# Patient Record
Sex: MALE | Race: WHITE | NOT HISPANIC OR LATINO | Employment: OTHER | URBAN - METROPOLITAN AREA
[De-identification: names, ages, dates, MRNs, and addresses within clinical notes are randomized per-mention and may not be internally consistent; named-entity substitution may affect disease eponyms.]

---

## 2018-03-21 ENCOUNTER — THERAPY VISIT (OUTPATIENT)
Dept: PHYSICAL THERAPY | Facility: CLINIC | Age: 78
End: 2018-03-21
Payer: MEDICARE

## 2018-03-21 DIAGNOSIS — M19.011 PRIMARY OSTEOARTHRITIS OF RIGHT SHOULDER: Primary | ICD-10-CM

## 2018-03-21 PROCEDURE — G8984 CARRY CURRENT STATUS: HCPCS | Mod: GP | Performed by: PHYSICAL THERAPIST

## 2018-03-21 PROCEDURE — 97110 THERAPEUTIC EXERCISES: CPT | Mod: GP | Performed by: PHYSICAL THERAPIST

## 2018-03-21 PROCEDURE — 97112 NEUROMUSCULAR REEDUCATION: CPT | Mod: GP | Performed by: PHYSICAL THERAPIST

## 2018-03-21 PROCEDURE — 97161 PT EVAL LOW COMPLEX 20 MIN: CPT | Mod: GP | Performed by: PHYSICAL THERAPIST

## 2018-03-21 PROCEDURE — G8985 CARRY GOAL STATUS: HCPCS | Mod: GP | Performed by: PHYSICAL THERAPIST

## 2018-03-21 NOTE — LETTER
DEPARTMENT OF HEALTH AND HUMAN SERVICES  CENTERS FOR MEDICARE & MEDICAID SERVICES    PLAN/UPDATED PLAN OF PROGRESS FOR OUTPATIENT REHABILITATION    PATIENTS NAME:  Imer Burns   : 1940  PROVIDER NUMBER:    [id number]  [place]:  [id number]  PROVIDER NAME: [facility]  MEDICAL RECORD NUMBER: [id number]   START OF CARE DATE:  SOC Date: 18   TYPE:  PT    PRIMARY/TREATMENT DIAGNOSIS: (Pertinent Medical Diagnosis)  Primary osteoarthritis of right shoulder    VISITS FROM START OF CARE:  Rxs Used: 1   [facility] Initial Evaluation  Subjective:  Patient is a 77 year old male presenting with rehab right shoulder hpi. The history is provided by the patient. No  was used.   Imer Burns is a 77 year old male with a right shoulder condition.  Condition occurred with:  Degenerative joint disease.  Condition occurred: other.  This is a chronic condition  3/15/18 pt saw MD for progressive right shoulder pain. MD dx of GH OA.  He did see another MD prior to seeing Dr Ramon and did receive an injection in clinic with moderate to significant relief.  Pt is right handed.  Patient reports pain:  In the joint.  Radiates to:  Upper arm.  Pain is described as sharp and is intermittent and reported as 7/10.  Associated symptoms:  Catching. Pain is worse during the day.  Symptoms are exacerbated by using arm at shoulder level, using arm overhead, lifting, using arm behind back and carrying (weight lifting, golf, racketball ) and relieved by rest.  Since onset symptoms are gradually worsening.  Special tests:  X-ray.  Previous treatment includes other (injection).  General health as reported by patient is good.  Pertinent medical history includes:  High blood pressure, cancer, heart problems, smoking and sleep disorder/apnea.  Other surgeries include:  Other, cancer surgery and orthopedic surgery. Current medications:  High blood pressure medication.  Current occupation is  Retired.        Barriers include:  None as reported by the patient.    Red flags:  None as reported by the patient.        Objective:  Standing Alignment:    Cervical/Thoracic:  Forward head  Shoulder/UE:  Protracted scapula R and scapular downward rotation R          Shoulder Evaluation:  ROM:  AROM:    Flexion:  Left:  150    Right:  122  Abduction:  Left: 158   Right:  118  External Rotation:  Left:  78    Right:  65  Flexion/External Rotation:  Left:  T3    Right:  T1  Extension/Internal Rotation:  Left:  T7    Right:  T12    Strength:  : Moderate to significant crepitus noted with MMT to R shoulder.  Flexion: Right: 5/5       Extension:  Right: 5/5      Abduction:  Right: 5/5       Adduction:  Right: 5/5       Internal Rotation:  Right: 5/5      External Rotation:   Right:5/5       Special Tests:    Right shoulder positive for the following special tests:Impingement (Sig tightness and mild pain with Neer, H-K, and crossover)  Palpation:  normal  Mobility Tests:    Glenohumeral posterior right:  Hypomobile    PATIENTS NAME:  Imer Burns   : 1940    Assessment/Plan:    Patient is a 77 year old male with right side shoulder complaints.    Patient has the following significant findings with corresponding treatment plan.                Diagnosis 1:  GH OA  Pain -  hot/cold therapy and home program  Decreased ROM/flexibility - manual therapy and therapeutic exercise  Decreased joint mobility - manual therapy and therapeutic exercise  Decreased strength - therapeutic exercise and therapeutic activities  Decreased proprioception - neuro re-education and therapeutic activities  Inflammation - cold therapy and self management/home program  Impaired muscle performance - neuro re-education  Decreased function - therapeutic activities  Impaired posture - neuro re-education    Therapy Evaluation Codes:   1) History comprised of:   Personal factors that impact the plan of care:      None.    Comorbidity factors that  "impact the plan of care are:      Cancer, Heart problems, High blood pressure and Sleep disorder/apnea.     Medications impacting care: Cardiac and High blood pressure.  2) Examination of Body Systems comprised of:   Body structures and functions that impact the plan of care:      Shoulder.   Activity limitations that impact the plan of care are:      Bathing, Dressing, Lifting, Sports and reaching.  3) Clinical presentation characteristics are:   Stable/Uncomplicated.  4) Decision-Making    Low complexity using standardized patient assessment instrument and/or measureable assessment of functional outcome.  Cumulative Therapy Evaluation is: Low complexity.    Previous and current functional limitations:  (See Goal Flow Sheet for this information)    Short term and Long term goals: (See Goal Flow Sheet for this information)     Communication ability:  Patient appears to be able to clearly communicate and understand verbal and written communication and follow directions correctly.  Treatment Explanation - The following has been discussed with the patient:     RX ordered/plan of care  Anticipated outcomes  Possible risks and side effects  This patient would benefit from PT intervention to resume normal activities.   Rehab potential is good.    Frequency:  1 X week, once daily  Duration:  for 6 weeks  Discharge Plan:  Achieve all LTG.  Independent in home treatment program.  Reach maximal therapeutic benefit.      Caregiver Signature/Credentials _____________________________ Date ________     Treating Provider: Kristin Mendez PT ATC   I have reviewed and certified the need for these services and plan of treatment while under my care.        PHYSICIAN'S SIGNATURE:   _________________________________________  Date___________    Ángel Ramon MD    Certification period:  Beginning of Cert date period: 03/21/18 to  End of Cert period date: 06/18/18     Functional Level Progress Report: Please see attached \"Goal " "Flow sheet for Functional level.\"    ____X____ Continue Services or       ________ DC Services                Service dates: From  SOC Date: 03/21/18 date to present                         "

## 2018-03-21 NOTE — PROGRESS NOTES
[facility] Initial Evaluation  Subjective:  Patient is a 77 year old male presenting with rehab left ankle/foot hpi.                                      Pertinent medical history includes:  High blood pressure, cancer, heart problems, smoking and sleep disorder/apnea.    Other surgeries include:  Other, cancer surgery and orthopedic surgery.  Current medications:  High blood pressure medication.  Current occupation is Retired.                                    Objective:  System    Physical Exam    General     ROS    Assessment/Plan:

## 2018-03-21 NOTE — PROGRESS NOTES
[facility] Initial Evaluation  Subjective:  Patient is a 77 year old male presenting with rehab right shoulder hpi. The history is provided by the patient. No  was used.   Imer Burns is a 77 year old male with a right shoulder condition.  Condition occurred with:  Degenerative joint disease.  Condition occurred: other.  This is a chronic condition  3/15/18 pt saw MD for progressive right shoulder pain. MD dx of GH OA.  He did see another MD prior to seeing Dr Ramon and did receive an injection in clinic with moderate to significant relief.  Pt is right handed..    Patient reports pain:  In the joint.  Radiates to:  Upper arm.  Pain is described as sharp and is intermittent and reported as 7/10.  Associated symptoms:  Catching. Pain is worse during the day.  Symptoms are exacerbated by using arm at shoulder level, using arm overhead, lifting, using arm behind back and carrying (weight lifting, golf, racketball ) and relieved by rest.  Since onset symptoms are gradually worsening.  Special tests:  X-ray.  Previous treatment includes other (injection).    General health as reported by patient is good.                  Barriers include:  None as reported by the patient.    Red flags:  None as reported by the patient.                        Objective:  Standing Alignment:    Cervical/Thoracic:  Forward head  Shoulder/UE:  Protracted scapula R and scapular downward rotation R                                       Shoulder Evaluation:  ROM:  AROM:    Flexion:  Left:  150    Right:  122    Abduction:  Left: 158   Right:  118      External Rotation:  Left:  78    Right:  65          Flexion/External Rotation:  Left:  T3    Right:  T1  Extension/Internal Rotation:  Left:  T7    Right:  T12          Strength:  : Moderate to significant crepitus noted with MMT to R shoulder.  Flexion: Right: 5/5     Pain:   Extension:  Right: 5/5    Pain:  Abduction:  Right: 5/5     Pain:  Adduction:   Right: 5/5     Pain:  Internal Rotation:  Right: 5/5     Pain:  External Rotation:   Right:5/5     Pain:              Special Tests:      Right shoulder positive for the following special tests:Impingement (Sig tightness and mild pain with Neer, H-K, and crossover)  Palpation:  normal      Mobility Tests:      Glenohumeral posterior right:  Hypomobile                                             General     ROS    Assessment/Plan:    Patient is a 77 year old male with right side shoulder complaints.    Patient has the following significant findings with corresponding treatment plan.                Diagnosis 1:  GH OA  Pain -  hot/cold therapy and home program  Decreased ROM/flexibility - manual therapy and therapeutic exercise  Decreased joint mobility - manual therapy and therapeutic exercise  Decreased strength - therapeutic exercise and therapeutic activities  Decreased proprioception - neuro re-education and therapeutic activities  Inflammation - cold therapy and self management/home program  Impaired muscle performance - neuro re-education  Decreased function - therapeutic activities  Impaired posture - neuro re-education    Therapy Evaluation Codes:   1) History comprised of:   Personal factors that impact the plan of care:      None.    Comorbidity factors that impact the plan of care are:      Cancer, Heart problems, High blood pressure and Sleep disorder/apnea.     Medications impacting care: Cardiac and High blood pressure.  2) Examination of Body Systems comprised of:   Body structures and functions that impact the plan of care:      Shoulder.   Activity limitations that impact the plan of care are:      Bathing, Dressing, Lifting, Sports and reaching.  3) Clinical presentation characteristics are:   Stable/Uncomplicated.  4) Decision-Making    Low complexity using standardized patient assessment instrument and/or measureable assessment of functional outcome.  Cumulative Therapy Evaluation is: Low  complexity.    Previous and current functional limitations:  (See Goal Flow Sheet for this information)    Short term and Long term goals: (See Goal Flow Sheet for this information)     Communication ability:  Patient appears to be able to clearly communicate and understand verbal and written communication and follow directions correctly.  Treatment Explanation - The following has been discussed with the patient:   RX ordered/plan of care  Anticipated outcomes  Possible risks and side effects  This patient would benefit from PT intervention to resume normal activities.   Rehab potential is good.    Frequency:  1 X week, once daily  Duration:  for 6 weeks  Discharge Plan:  Achieve all LTG.  Independent in home treatment program.  Reach maximal therapeutic benefit.    Please refer to the daily flowsheet for treatment today, total treatment time and time spent performing 1:1 timed codes.

## 2018-03-30 ENCOUNTER — THERAPY VISIT (OUTPATIENT)
Dept: PHYSICAL THERAPY | Facility: CLINIC | Age: 78
End: 2018-03-30
Payer: MEDICARE

## 2018-03-30 DIAGNOSIS — M19.011 PRIMARY OSTEOARTHRITIS OF RIGHT SHOULDER: ICD-10-CM

## 2018-03-30 PROCEDURE — 97110 THERAPEUTIC EXERCISES: CPT | Mod: GP | Performed by: PHYSICAL THERAPIST

## 2018-03-30 PROCEDURE — 97112 NEUROMUSCULAR REEDUCATION: CPT | Mod: GP | Performed by: PHYSICAL THERAPIST

## 2018-03-30 PROCEDURE — 97140 MANUAL THERAPY 1/> REGIONS: CPT | Mod: GP | Performed by: PHYSICAL THERAPIST

## 2018-04-04 ENCOUNTER — THERAPY VISIT (OUTPATIENT)
Dept: PHYSICAL THERAPY | Facility: CLINIC | Age: 78
End: 2018-04-04
Payer: MEDICARE

## 2018-04-04 DIAGNOSIS — M19.011 PRIMARY OSTEOARTHRITIS OF RIGHT SHOULDER: ICD-10-CM

## 2018-04-04 PROCEDURE — 97110 THERAPEUTIC EXERCISES: CPT | Mod: GP | Performed by: PHYSICAL THERAPIST

## 2018-04-04 PROCEDURE — 97112 NEUROMUSCULAR REEDUCATION: CPT | Mod: GP | Performed by: PHYSICAL THERAPIST

## 2018-04-04 PROCEDURE — 97140 MANUAL THERAPY 1/> REGIONS: CPT | Mod: GP | Performed by: PHYSICAL THERAPIST

## 2018-04-17 ENCOUNTER — THERAPY VISIT (OUTPATIENT)
Dept: PHYSICAL THERAPY | Facility: CLINIC | Age: 78
End: 2018-04-17
Payer: MEDICARE

## 2018-04-17 DIAGNOSIS — M19.011 PRIMARY OSTEOARTHRITIS OF RIGHT SHOULDER: ICD-10-CM

## 2018-04-17 PROCEDURE — 97110 THERAPEUTIC EXERCISES: CPT | Mod: GP | Performed by: PHYSICAL THERAPIST

## 2018-04-17 PROCEDURE — G8985 CARRY GOAL STATUS: HCPCS | Mod: GP | Performed by: PHYSICAL THERAPIST

## 2018-04-17 PROCEDURE — 97140 MANUAL THERAPY 1/> REGIONS: CPT | Mod: GP | Performed by: PHYSICAL THERAPIST

## 2018-04-17 PROCEDURE — G8986 CARRY D/C STATUS: HCPCS | Mod: GP | Performed by: PHYSICAL THERAPIST

## 2018-04-17 PROCEDURE — 97112 NEUROMUSCULAR REEDUCATION: CPT | Mod: GP | Performed by: PHYSICAL THERAPIST

## 2018-04-17 NOTE — PROGRESS NOTES
Subjective:  HPI                    Objective:  System                   Shoulder Evaluation:  ROM:  AROM:    Flexion:  Left:  150    Right:  136    Abduction:  Left: 158   Right:  130      External Rotation:  Left:  78    Right:  73            Extension/Internal Rotation:  Left:  T7    Right:  T11          Strength:    Flexion: Right: 5/5     Pain:   Extension:  Right: 5/5    Pain:  Abduction:  Right: 5/5     Pain:  Adduction:  Right: 5/5     Pain:  Internal Rotation:  Right: 5/5     Pain:  External Rotation:   Right:4+/5     Pain:              Special Tests:  Special tests assessed shoulder: Tightness with impingement testing on R; mild pain with H-K.      Palpation:  normal      Mobility Tests:      Glenohumeral posterior right:  Hypomobile                                             General     ROS    Assessment/Plan:    DISCHARGE REPORT    Progress reporting period is from 3/21/18 to 4/17/18.       SUBJECTIVE  Subjective changes noted by patient:  Imer responded very well to the injection and his HEP is going well.  C/C is difficult with reaching.       Current pain level is 0/10  .    Initial Pain level: 7/10.   Changes in function:  Yes (See Goal flowsheet attached for changes in current functional level)  Adverse reaction to treatment or activity: None    OBJECTIVE  HEP is focusing on 4 corners stretching, sleeper stretch (stressed pain free), supine RC program, scap stab, and we have instructed in the gradual return to safe/modified weight lifting.        ASSESSMENT/PLAN  Updated problem list and treatment plan: Diagnosis 1:  Right shoulder GH OA  Pain -  hot/cold therapy and home program  Decreased ROM/flexibility - manual therapy and therapeutic exercise  Decreased joint mobility - manual therapy and therapeutic exercise  Decreased strength - therapeutic exercise and therapeutic activities  Decreased proprioception - neuro re-education and therapeutic activities  Inflammation - cold therapy and self  management/home program  Impaired muscle performance - neuro re-education  Decreased function - therapeutic activities  Impaired posture - neuro re-education  STG/LTGs have been met or progress has been made towards goals:  Yes (See Goal flow sheet completed today.)  Assessment of Progress: The patient's condition is improving.  Self Management Plans:  Patient is independent in a home treatment program.  Patient is independent in self management of symptoms.  I have re-evaluated this patient and find that the nature, scope, duration and intensity of the therapy is appropriate for the medical condition of the patient.  Imer continues to require the following intervention to meet STG and LTG's:  PT intervention is no longer required to meet STG/LTG.    Recommendations:  This patient is ready to be discharged from therapy and continue their home treatment program.    Please refer to the daily flowsheet for treatment today, total treatment time and time spent performing 1:1 timed codes.

## 2018-05-31 PROBLEM — M19.011 PRIMARY OSTEOARTHRITIS OF RIGHT SHOULDER: Status: RESOLVED | Noted: 2018-03-21 | Resolved: 2018-05-31

## 2019-02-11 ENCOUNTER — HOSPITAL ENCOUNTER (OUTPATIENT)
Dept: LAB | Facility: CLINIC | Age: 79
Discharge: HOME | End: 2019-02-11
Attending: ORTHOPAEDIC SURGERY | Admitting: ORTHOPAEDIC SURGERY
Payer: COMMERCIAL

## 2019-02-11 DIAGNOSIS — Z01.812 PRE-OPERATIVE LABORATORY EXAMINATION: ICD-10-CM

## 2019-02-11 LAB
MRSA DNA SPEC QL NAA+PROBE: NEGATIVE
SPECIMEN SOURCE: NORMAL

## 2019-02-11 PROCEDURE — 87640 STAPH A DNA AMP PROBE: CPT | Performed by: ORTHOPAEDIC SURGERY

## 2019-02-11 PROCEDURE — 40000830 ZZHCL STATISTIC STAPH AUREUS METH RESIST SCREEN PCR: Performed by: ORTHOPAEDIC SURGERY

## 2019-02-11 PROCEDURE — 87641 MR-STAPH DNA AMP PROBE: CPT | Performed by: ORTHOPAEDIC SURGERY

## 2019-02-11 PROCEDURE — 40000829 ZZHCL STATISTIC STAPH AUREUS SUSCEPT SCREEN PCR: Performed by: ORTHOPAEDIC SURGERY

## 2019-03-08 ENCOUNTER — HOSPITAL ENCOUNTER (INPATIENT)
Facility: CLINIC | Age: 79
LOS: 2 days | Discharge: HOME | DRG: 470 | End: 2019-03-10
Attending: ORTHOPAEDIC SURGERY | Admitting: ORTHOPAEDIC SURGERY
Payer: COMMERCIAL

## 2019-03-08 ENCOUNTER — ANESTHESIA (OUTPATIENT)
Dept: SURGERY | Facility: CLINIC | Age: 79
DRG: 470 | End: 2019-03-08
Payer: COMMERCIAL

## 2019-03-08 ENCOUNTER — ANESTHESIA EVENT (OUTPATIENT)
Dept: SURGERY | Facility: CLINIC | Age: 79
DRG: 470 | End: 2019-03-08
Payer: COMMERCIAL

## 2019-03-08 DIAGNOSIS — Z96.651 H/O TOTAL KNEE REPLACEMENT, RIGHT: Primary | ICD-10-CM

## 2019-03-08 LAB
CREAT SERPL-MCNC: 1.34 MG/DL (ref 0.66–1.25)
GFR SERPL CREATININE-BSD FRML MDRD: 51 ML/MIN/{1.73_M2}
GLUCOSE BLDC GLUCOMTR-MCNC: 139 MG/DL (ref 70–99)
HGB BLD-MCNC: 14.8 G/DL (ref 13.3–17.7)
POTASSIUM SERPL-SCNC: 4.3 MMOL/L (ref 3.4–5.3)

## 2019-03-08 PROCEDURE — 0SRC0J9 REPLACEMENT OF RIGHT KNEE JOINT WITH SYNTHETIC SUBSTITUTE, CEMENTED, OPEN APPROACH: ICD-10-PCS | Performed by: ORTHOPAEDIC SURGERY

## 2019-03-08 PROCEDURE — 25800030 ZZH RX IP 258 OP 636: Performed by: ANESTHESIOLOGY

## 2019-03-08 PROCEDURE — 40000170 ZZH STATISTIC PRE-PROCEDURE ASSESSMENT II: Performed by: ORTHOPAEDIC SURGERY

## 2019-03-08 PROCEDURE — 82565 ASSAY OF CREATININE: CPT | Performed by: ANESTHESIOLOGY

## 2019-03-08 PROCEDURE — 25000566 ZZH SEVOFLURANE, EA 15 MIN: Performed by: ORTHOPAEDIC SURGERY

## 2019-03-08 PROCEDURE — 25800030 ZZH RX IP 258 OP 636: Performed by: PHYSICIAN ASSISTANT

## 2019-03-08 PROCEDURE — 36415 COLL VENOUS BLD VENIPUNCTURE: CPT | Performed by: ANESTHESIOLOGY

## 2019-03-08 PROCEDURE — 25000132 ZZH RX MED GY IP 250 OP 250 PS 637: Performed by: ORTHOPAEDIC SURGERY

## 2019-03-08 PROCEDURE — 85018 HEMOGLOBIN: CPT | Performed by: ANESTHESIOLOGY

## 2019-03-08 PROCEDURE — 25800030 ZZH RX IP 258 OP 636: Performed by: ORTHOPAEDIC SURGERY

## 2019-03-08 PROCEDURE — 25000125 ZZHC RX 250: Performed by: ORTHOPAEDIC SURGERY

## 2019-03-08 PROCEDURE — C1776 JOINT DEVICE (IMPLANTABLE): HCPCS | Performed by: ORTHOPAEDIC SURGERY

## 2019-03-08 PROCEDURE — 25000125 ZZHC RX 250: Performed by: PHYSICIAN ASSISTANT

## 2019-03-08 PROCEDURE — 12000000 ZZH R&B MED SURG/OB

## 2019-03-08 PROCEDURE — 25000125 ZZHC RX 250: Performed by: NURSE ANESTHETIST, CERTIFIED REGISTERED

## 2019-03-08 PROCEDURE — 37000009 ZZH ANESTHESIA TECHNICAL FEE, EACH ADDTL 15 MIN: Performed by: ORTHOPAEDIC SURGERY

## 2019-03-08 PROCEDURE — 25800025 ZZH RX 258: Performed by: ORTHOPAEDIC SURGERY

## 2019-03-08 PROCEDURE — 27210794 ZZH OR GENERAL SUPPLY STERILE: Performed by: ORTHOPAEDIC SURGERY

## 2019-03-08 PROCEDURE — 36000063 ZZH SURGERY LEVEL 4 EA 15 ADDTL MIN: Performed by: ORTHOPAEDIC SURGERY

## 2019-03-08 PROCEDURE — 00000146 ZZHCL STATISTIC GLUCOSE BY METER IP

## 2019-03-08 PROCEDURE — 25000128 H RX IP 250 OP 636: Performed by: PHYSICIAN ASSISTANT

## 2019-03-08 PROCEDURE — 37000008 ZZH ANESTHESIA TECHNICAL FEE, 1ST 30 MIN: Performed by: ORTHOPAEDIC SURGERY

## 2019-03-08 PROCEDURE — 84132 ASSAY OF SERUM POTASSIUM: CPT | Performed by: ANESTHESIOLOGY

## 2019-03-08 PROCEDURE — 71000013 ZZH RECOVERY PHASE 1 LEVEL 1 EA ADDTL HR: Performed by: ORTHOPAEDIC SURGERY

## 2019-03-08 PROCEDURE — 25000128 H RX IP 250 OP 636: Performed by: ORTHOPAEDIC SURGERY

## 2019-03-08 PROCEDURE — 25000128 H RX IP 250 OP 636: Performed by: ANESTHESIOLOGY

## 2019-03-08 PROCEDURE — 25000128 H RX IP 250 OP 636: Performed by: NURSE ANESTHETIST, CERTIFIED REGISTERED

## 2019-03-08 PROCEDURE — 27110028 ZZH OR GENERAL SUPPLY NON-STERILE: Performed by: ORTHOPAEDIC SURGERY

## 2019-03-08 PROCEDURE — 27810169 ZZH OR IMPLANT GENERAL: Performed by: ORTHOPAEDIC SURGERY

## 2019-03-08 PROCEDURE — 71000012 ZZH RECOVERY PHASE 1 LEVEL 1 FIRST HR: Performed by: ORTHOPAEDIC SURGERY

## 2019-03-08 PROCEDURE — 25800030 ZZH RX IP 258 OP 636: Performed by: NURSE ANESTHETIST, CERTIFIED REGISTERED

## 2019-03-08 PROCEDURE — 36000093 ZZH SURGERY LEVEL 4 1ST 30 MIN: Performed by: ORTHOPAEDIC SURGERY

## 2019-03-08 DEVICE — BONE CEMENT SIMPLEX W/TOBRAMYCIN 6197-9-001: Type: IMPLANTABLE DEVICE | Site: KNEE | Status: FUNCTIONAL

## 2019-03-08 DEVICE — IMPLANTABLE DEVICE: Type: IMPLANTABLE DEVICE | Site: KNEE | Status: FUNCTIONAL

## 2019-03-08 DEVICE — BONE CEMENT SIMPLEX 1/2 DOSE 6188-1-001: Type: IMPLANTABLE DEVICE | Site: KNEE | Status: FUNCTIONAL

## 2019-03-08 RX ORDER — ATORVASTATIN CALCIUM 20 MG/1
40 TABLET, FILM COATED ORAL EVERY EVENING
Status: DISCONTINUED | OUTPATIENT
Start: 2019-03-08 | End: 2019-03-10 | Stop reason: HOSPADM

## 2019-03-08 RX ORDER — OXYCODONE HYDROCHLORIDE 5 MG/1
5-10 TABLET ORAL EVERY 4 HOURS PRN
Qty: 30 TABLET | Refills: 0 | Status: SHIPPED | OUTPATIENT
Start: 2019-03-08

## 2019-03-08 RX ORDER — CARVEDILOL 25 MG/1
25 TABLET ORAL 2 TIMES DAILY WITH MEALS
Status: DISCONTINUED | OUTPATIENT
Start: 2019-03-08 | End: 2019-03-10 | Stop reason: HOSPADM

## 2019-03-08 RX ORDER — AMLODIPINE BESYLATE 10 MG/1
10 TABLET ORAL EVERY MORNING
Status: DISCONTINUED | OUTPATIENT
Start: 2019-03-09 | End: 2019-03-10 | Stop reason: HOSPADM

## 2019-03-08 RX ORDER — AMOXICILLIN 250 MG
1 CAPSULE ORAL 2 TIMES DAILY
Status: DISCONTINUED | OUTPATIENT
Start: 2019-03-08 | End: 2019-03-10 | Stop reason: HOSPADM

## 2019-03-08 RX ORDER — LIDOCAINE HYDROCHLORIDE 20 MG/ML
INJECTION, SOLUTION INFILTRATION; PERINEURAL PRN
Status: DISCONTINUED | OUTPATIENT
Start: 2019-03-08 | End: 2019-03-08

## 2019-03-08 RX ORDER — SODIUM CHLORIDE, SODIUM LACTATE, POTASSIUM CHLORIDE, CALCIUM CHLORIDE 600; 310; 30; 20 MG/100ML; MG/100ML; MG/100ML; MG/100ML
INJECTION, SOLUTION INTRAVENOUS CONTINUOUS
Status: DISCONTINUED | OUTPATIENT
Start: 2019-03-08 | End: 2019-03-10 | Stop reason: HOSPADM

## 2019-03-08 RX ORDER — CEFAZOLIN SODIUM 1 G/3ML
INJECTION, POWDER, FOR SOLUTION INTRAMUSCULAR; INTRAVENOUS PRN
Status: DISCONTINUED | OUTPATIENT
Start: 2019-03-08 | End: 2019-03-08

## 2019-03-08 RX ORDER — FENTANYL CITRATE 50 UG/ML
25-50 INJECTION, SOLUTION INTRAMUSCULAR; INTRAVENOUS
Status: DISCONTINUED | OUTPATIENT
Start: 2019-03-08 | End: 2019-03-08 | Stop reason: HOSPADM

## 2019-03-08 RX ORDER — LANOLIN ALCOHOL/MO/W.PET/CERES
1000 CREAM (GRAM) TOPICAL EVERY MORNING
Status: DISCONTINUED | OUTPATIENT
Start: 2019-03-09 | End: 2019-03-10 | Stop reason: HOSPADM

## 2019-03-08 RX ORDER — ONDANSETRON 2 MG/ML
INJECTION INTRAMUSCULAR; INTRAVENOUS PRN
Status: DISCONTINUED | OUTPATIENT
Start: 2019-03-08 | End: 2019-03-08

## 2019-03-08 RX ORDER — HYDROMORPHONE HYDROCHLORIDE 1 MG/ML
.3-.5 INJECTION, SOLUTION INTRAMUSCULAR; INTRAVENOUS; SUBCUTANEOUS EVERY 5 MIN PRN
Status: DISCONTINUED | OUTPATIENT
Start: 2019-03-08 | End: 2019-03-08 | Stop reason: HOSPADM

## 2019-03-08 RX ORDER — ONDANSETRON 4 MG/1
4 TABLET, ORALLY DISINTEGRATING ORAL EVERY 6 HOURS PRN
Status: DISCONTINUED | OUTPATIENT
Start: 2019-03-08 | End: 2019-03-10 | Stop reason: HOSPADM

## 2019-03-08 RX ORDER — SODIUM CHLORIDE, SODIUM LACTATE, POTASSIUM CHLORIDE, CALCIUM CHLORIDE 600; 310; 30; 20 MG/100ML; MG/100ML; MG/100ML; MG/100ML
INJECTION, SOLUTION INTRAVENOUS CONTINUOUS
Status: DISCONTINUED | OUTPATIENT
Start: 2019-03-08 | End: 2019-03-08 | Stop reason: HOSPADM

## 2019-03-08 RX ORDER — HYDROMORPHONE HYDROCHLORIDE 1 MG/ML
.3-.5 INJECTION, SOLUTION INTRAMUSCULAR; INTRAVENOUS; SUBCUTANEOUS
Status: DISCONTINUED | OUTPATIENT
Start: 2019-03-08 | End: 2019-03-10 | Stop reason: HOSPADM

## 2019-03-08 RX ORDER — OXYCODONE HYDROCHLORIDE 5 MG/1
5-10 TABLET ORAL EVERY 4 HOURS PRN
Status: DISCONTINUED | OUTPATIENT
Start: 2019-03-08 | End: 2019-03-10 | Stop reason: HOSPADM

## 2019-03-08 RX ORDER — ACETAMINOPHEN 325 MG/1
975 TABLET ORAL EVERY 8 HOURS
Status: DISCONTINUED | OUTPATIENT
Start: 2019-03-08 | End: 2019-03-10 | Stop reason: HOSPADM

## 2019-03-08 RX ORDER — FENTANYL CITRATE 50 UG/ML
INJECTION, SOLUTION INTRAMUSCULAR; INTRAVENOUS PRN
Status: DISCONTINUED | OUTPATIENT
Start: 2019-03-08 | End: 2019-03-08

## 2019-03-08 RX ORDER — NALOXONE HYDROCHLORIDE 0.4 MG/ML
.1-.4 INJECTION, SOLUTION INTRAMUSCULAR; INTRAVENOUS; SUBCUTANEOUS
Status: DISCONTINUED | OUTPATIENT
Start: 2019-03-08 | End: 2019-03-08

## 2019-03-08 RX ORDER — NALOXONE HYDROCHLORIDE 0.4 MG/ML
.1-.4 INJECTION, SOLUTION INTRAMUSCULAR; INTRAVENOUS; SUBCUTANEOUS
Status: DISCONTINUED | OUTPATIENT
Start: 2019-03-08 | End: 2019-03-10 | Stop reason: HOSPADM

## 2019-03-08 RX ORDER — AMOXICILLIN 250 MG
2 CAPSULE ORAL 2 TIMES DAILY
Status: DISCONTINUED | OUTPATIENT
Start: 2019-03-08 | End: 2019-03-10 | Stop reason: HOSPADM

## 2019-03-08 RX ORDER — CEFAZOLIN SODIUM 2 G/100ML
2 INJECTION, SOLUTION INTRAVENOUS EVERY 8 HOURS
Status: COMPLETED | OUTPATIENT
Start: 2019-03-08 | End: 2019-03-09

## 2019-03-08 RX ORDER — ONDANSETRON 2 MG/ML
4 INJECTION INTRAMUSCULAR; INTRAVENOUS EVERY 30 MIN PRN
Status: DISCONTINUED | OUTPATIENT
Start: 2019-03-08 | End: 2019-03-08 | Stop reason: HOSPADM

## 2019-03-08 RX ORDER — ONDANSETRON 2 MG/ML
4 INJECTION INTRAMUSCULAR; INTRAVENOUS EVERY 6 HOURS PRN
Status: DISCONTINUED | OUTPATIENT
Start: 2019-03-08 | End: 2019-03-10 | Stop reason: HOSPADM

## 2019-03-08 RX ORDER — EPHEDRINE SULFATE 50 MG/ML
INJECTION, SOLUTION INTRAMUSCULAR; INTRAVENOUS; SUBCUTANEOUS PRN
Status: DISCONTINUED | OUTPATIENT
Start: 2019-03-08 | End: 2019-03-08

## 2019-03-08 RX ORDER — PROPOFOL 10 MG/ML
INJECTION, EMULSION INTRAVENOUS CONTINUOUS PRN
Status: DISCONTINUED | OUTPATIENT
Start: 2019-03-08 | End: 2019-03-08

## 2019-03-08 RX ORDER — CEPHALEXIN 500 MG/1
1000 CAPSULE ORAL 2 TIMES DAILY PRN
COMMUNITY

## 2019-03-08 RX ORDER — CEFAZOLIN SODIUM 1 G/3ML
1 INJECTION, POWDER, FOR SOLUTION INTRAMUSCULAR; INTRAVENOUS SEE ADMIN INSTRUCTIONS
Status: DISCONTINUED | OUTPATIENT
Start: 2019-03-08 | End: 2019-03-08 | Stop reason: HOSPADM

## 2019-03-08 RX ORDER — HYDROXYZINE HYDROCHLORIDE 10 MG/1
10 TABLET, FILM COATED ORAL EVERY 6 HOURS PRN
Status: DISCONTINUED | OUTPATIENT
Start: 2019-03-08 | End: 2019-03-10 | Stop reason: HOSPADM

## 2019-03-08 RX ORDER — PROPOFOL 10 MG/ML
INJECTION, EMULSION INTRAVENOUS PRN
Status: DISCONTINUED | OUTPATIENT
Start: 2019-03-08 | End: 2019-03-08

## 2019-03-08 RX ORDER — LIDOCAINE 40 MG/G
CREAM TOPICAL
Status: DISCONTINUED | OUTPATIENT
Start: 2019-03-08 | End: 2019-03-10 | Stop reason: HOSPADM

## 2019-03-08 RX ORDER — ASPIRIN 81 MG/1
81 TABLET ORAL EVERY EVENING
Status: ON HOLD | COMMUNITY
End: 2019-03-10

## 2019-03-08 RX ORDER — ACETAMINOPHEN 325 MG/1
650 TABLET ORAL EVERY 4 HOURS PRN
Status: DISCONTINUED | OUTPATIENT
Start: 2019-03-11 | End: 2019-03-10 | Stop reason: HOSPADM

## 2019-03-08 RX ORDER — CEFAZOLIN SODIUM 2 G/100ML
2 INJECTION, SOLUTION INTRAVENOUS
Status: COMPLETED | OUTPATIENT
Start: 2019-03-08 | End: 2019-03-08

## 2019-03-08 RX ORDER — DEXAMETHASONE SODIUM PHOSPHATE 4 MG/ML
INJECTION, SOLUTION INTRA-ARTICULAR; INTRALESIONAL; INTRAMUSCULAR; INTRAVENOUS; SOFT TISSUE PRN
Status: DISCONTINUED | OUTPATIENT
Start: 2019-03-08 | End: 2019-03-08

## 2019-03-08 RX ORDER — ONDANSETRON 4 MG/1
4 TABLET, ORALLY DISINTEGRATING ORAL EVERY 30 MIN PRN
Status: DISCONTINUED | OUTPATIENT
Start: 2019-03-08 | End: 2019-03-08 | Stop reason: HOSPADM

## 2019-03-08 RX ORDER — FENTANYL CITRATE 50 UG/ML
50 INJECTION, SOLUTION INTRAMUSCULAR; INTRAVENOUS
Status: DISCONTINUED | OUTPATIENT
Start: 2019-03-08 | End: 2019-03-08 | Stop reason: HOSPADM

## 2019-03-08 RX ORDER — UBIDECARENONE 100 MG
200 CAPSULE ORAL EVERY EVENING
Status: DISCONTINUED | OUTPATIENT
Start: 2019-03-08 | End: 2019-03-10 | Stop reason: HOSPADM

## 2019-03-08 RX ADMIN — OXYCODONE HYDROCHLORIDE 5 MG: 5 TABLET ORAL at 20:18

## 2019-03-08 RX ADMIN — HYDROMORPHONE HYDROCHLORIDE 0.2 MG: 1 INJECTION, SOLUTION INTRAMUSCULAR; INTRAVENOUS; SUBCUTANEOUS at 12:35

## 2019-03-08 RX ADMIN — Medication 5 MG: at 10:30

## 2019-03-08 RX ADMIN — ACETAMINOPHEN 975 MG: 325 TABLET, FILM COATED ORAL at 21:36

## 2019-03-08 RX ADMIN — SODIUM CHLORIDE 1 G: 9 INJECTION, SOLUTION INTRAVENOUS at 10:37

## 2019-03-08 RX ADMIN — ACETAMINOPHEN 975 MG: 325 TABLET, FILM COATED ORAL at 15:05

## 2019-03-08 RX ADMIN — CEFAZOLIN SODIUM 2 G: 2 INJECTION, SOLUTION INTRAVENOUS at 10:26

## 2019-03-08 RX ADMIN — SODIUM CHLORIDE, POTASSIUM CHLORIDE, SODIUM LACTATE AND CALCIUM CHLORIDE: 600; 310; 30; 20 INJECTION, SOLUTION INTRAVENOUS at 09:33

## 2019-03-08 RX ADMIN — SENNOSIDES AND DOCUSATE SODIUM 1 TABLET: 8.6; 5 TABLET ORAL at 20:18

## 2019-03-08 RX ADMIN — FENTANYL CITRATE 50 MCG: 50 INJECTION, SOLUTION INTRAMUSCULAR; INTRAVENOUS at 11:01

## 2019-03-08 RX ADMIN — FENTANYL CITRATE 50 MCG: 50 INJECTION, SOLUTION INTRAMUSCULAR; INTRAVENOUS at 10:47

## 2019-03-08 RX ADMIN — ATORVASTATIN CALCIUM 40 MG: 20 TABLET, FILM COATED ORAL at 19:35

## 2019-03-08 RX ADMIN — HYDROMORPHONE HYDROCHLORIDE 0.3 MG: 1 INJECTION, SOLUTION INTRAMUSCULAR; INTRAVENOUS; SUBCUTANEOUS at 12:27

## 2019-03-08 RX ADMIN — SODIUM CHLORIDE 1 G: 9 INJECTION, SOLUTION INTRAVENOUS at 12:17

## 2019-03-08 RX ADMIN — SODIUM CHLORIDE, POTASSIUM CHLORIDE, SODIUM LACTATE AND CALCIUM CHLORIDE: 600; 310; 30; 20 INJECTION, SOLUTION INTRAVENOUS at 15:00

## 2019-03-08 RX ADMIN — CEFAZOLIN SODIUM 2 G: 2 INJECTION, SOLUTION INTRAVENOUS at 19:35

## 2019-03-08 RX ADMIN — PHENYLEPHRINE HYDROCHLORIDE 50 MCG: 10 INJECTION, SOLUTION INTRAMUSCULAR; INTRAVENOUS; SUBCUTANEOUS at 10:39

## 2019-03-08 RX ADMIN — PROPOFOL 30 MCG/KG/MIN: 10 INJECTION, EMULSION INTRAVENOUS at 10:29

## 2019-03-08 RX ADMIN — LIDOCAINE HYDROCHLORIDE 60 MG: 20 INJECTION, SOLUTION INFILTRATION; PERINEURAL at 10:25

## 2019-03-08 RX ADMIN — CARVEDILOL 25 MG: 25 TABLET, FILM COATED ORAL at 18:28

## 2019-03-08 RX ADMIN — PROPOFOL 160 MG: 10 INJECTION, EMULSION INTRAVENOUS at 10:25

## 2019-03-08 RX ADMIN — MIDAZOLAM HYDROCHLORIDE 2 MG: 1 INJECTION, SOLUTION INTRAMUSCULAR; INTRAVENOUS at 09:45

## 2019-03-08 RX ADMIN — CEFAZOLIN 1 G: 1 INJECTION, POWDER, FOR SOLUTION INTRAMUSCULAR; INTRAVENOUS at 12:23

## 2019-03-08 RX ADMIN — Medication 5 MG: at 10:28

## 2019-03-08 RX ADMIN — ONDANSETRON 4 MG: 2 INJECTION INTRAMUSCULAR; INTRAVENOUS at 12:17

## 2019-03-08 RX ADMIN — SODIUM CHLORIDE, POTASSIUM CHLORIDE, SODIUM LACTATE AND CALCIUM CHLORIDE: 600; 310; 30; 20 INJECTION, SOLUTION INTRAVENOUS at 12:08

## 2019-03-08 RX ADMIN — FENTANYL CITRATE 50 MCG: 50 INJECTION, SOLUTION INTRAMUSCULAR; INTRAVENOUS at 10:25

## 2019-03-08 RX ADMIN — Medication 5 MG: at 10:39

## 2019-03-08 RX ADMIN — ASPIRIN 325 MG: 325 TABLET, DELAYED RELEASE ORAL at 20:18

## 2019-03-08 RX ADMIN — HYDROMORPHONE HYDROCHLORIDE 0.5 MG: 1 INJECTION, SOLUTION INTRAMUSCULAR; INTRAVENOUS; SUBCUTANEOUS at 10:56

## 2019-03-08 RX ADMIN — Medication 200 MG: at 19:35

## 2019-03-08 RX ADMIN — DEXAMETHASONE SODIUM PHOSPHATE 4 MG: 4 INJECTION, SOLUTION INTRA-ARTICULAR; INTRALESIONAL; INTRAMUSCULAR; INTRAVENOUS; SOFT TISSUE at 10:36

## 2019-03-08 ASSESSMENT — COPD QUESTIONNAIRES: COPD: 0

## 2019-03-08 ASSESSMENT — ACTIVITIES OF DAILY LIVING (ADL)
ADLS_ACUITY_SCORE: 14
ADLS_ACUITY_SCORE: 14

## 2019-03-08 ASSESSMENT — LIFESTYLE VARIABLES: TOBACCO_USE: 0

## 2019-03-08 ASSESSMENT — MIFFLIN-ST. JEOR: SCORE: 1584.48

## 2019-03-08 NOTE — ANESTHESIA CARE TRANSFER NOTE
Patient: Imer Burns    Procedure(s):  RIGHT TOTAL KNEE ARTHROPLASTY    Diagnosis: RIGHT KNEE DJD  Diagnosis Additional Information: No value filed.    Anesthesia Type:   Periph. Nerve Block for postop pain, General     Note:  Airway :Face Mask  Patient transferred to:PACU  Comments: At end of procedure, spontaneous respirations, adequate tidal volumes, followed commands to voice, LMA removed atraumatically. Oxygen via facemask at 8 liters per minute to PACU. Oxygen tubing connected to wall O2 in PACU, SpO2, NiBP, and EKG monitors and alarms on and functioning, Jesus Hugger warmer connected to patient gown, report on patient's clinical status given to PACU RN, RN questions answered.Handoff Report: Identifed the Patient, Identified the Reponsible Provider, Reviewed the pertinent medical history, Discussed the surgical course, Reviewed Intra-OP anesthesia mangement and issues during anesthesia, Set expectations for post-procedure period and Allowed opportunity for questions and acknowledgement of understanding      Vitals: (Last set prior to Anesthesia Care Transfer)    CRNA VITALS  3/8/2019 1211 - 3/8/2019 1250      3/8/2019             Pulse:  60    SpO2:  91 %    Resp Rate (observed): 10    Resp Rate (set):  10                Electronically Signed By: LEANA Gutierrez CRNA  March 8, 2019  12:50 PM

## 2019-03-08 NOTE — ANESTHESIA POSTPROCEDURE EVALUATION
Patient: Imer Burns    Procedure(s):  RIGHT TOTAL KNEE ARTHROPLASTY    Diagnosis:RIGHT KNEE DJD  Diagnosis Additional Information: No value filed.    Anesthesia Type:  Periph. Nerve Block for postop pain, General    Note:  Anesthesia Post Evaluation    Patient location during evaluation: PACU  Patient participation: Able to fully participate in evaluation  Level of consciousness: awake, awake and alert and responsive to verbal stimuli  Pain management: adequate  Airway patency: patent  Cardiovascular status: acceptable  Respiratory status: acceptable  Hydration status: acceptable  PONV: none     Anesthetic complications: None          Last vitals:  Vitals:    03/08/19 1350 03/08/19 1400 03/08/19 1421   BP: (!) 116/103 147/74    Pulse: 56 56 53   Resp: 10 12 10   Temp:   36.3  C (97.4  F)   SpO2: 94% 95% 92%         Electronically Signed By: Adela Abad  March 8, 2019  2:41 PM

## 2019-03-08 NOTE — PROGRESS NOTES
Pt arrived to pacu with simple mask on 10 liters of oxygen. Pt sat are 88% on arrival. Pt has a hx of sleep apnea. Pt placed on cpap. Imer is awake at times but does fall asleep Rt called to set pt own cpap on. Pt continue to maintain 88%, IQ=693. Dr Sosa updated on pt. RT suggests bipap. DR sosa verbally order to place pt on bipap

## 2019-03-08 NOTE — ANESTHESIA PREPROCEDURE EVALUATION
Anesthesia Evaluation     . Pt has had prior anesthetic. Type: General    No history of anesthetic complications          ROS/MED HX    ENT/Pulmonary:     (+)sleep apnea, uses CPAP , . .   (-) tobacco use, asthma and COPD   Neurologic:       Cardiovascular: Comment: RENOVASCULAR HTN    (+) Dyslipidemia, hypertension-range: renal HTN, -CAD (completely occluded Coronary artery per patient ), --. : . . . :. .      (-) angina, stent, angina and CABG   METS/Exercise Tolerance:  >4 METS   Hematologic:         Musculoskeletal: Comment: Low back pain        GI/Hepatic:     (+) GERD Other GI/Hepatic gastritis      (-) liver disease   Renal/Genitourinary:     (+) chronic renal disease, type: CRI,       Endo:     (+) type II DM .      Psychiatric:         Infectious Disease:         Malignancy:   (+) Malignancy (bladder ca)           Other:    (+) H/O Chronic Pain,                   Physical Exam  Normal systems: cardiovascular, pulmonary and dental    Airway   Mallampati: I  TM distance: >3 FB  Neck ROM: full    Dental     Cardiovascular   Rhythm and rate: regular      Pulmonary    breath sounds clear to auscultation                        Anesthesia Plan      History & Physical Review  History and physical reviewed and following examination; no interval change.    ASA Status:  3 .    NPO Status:  > 8 hours    Plan for Periph. Nerve Block for postop pain and General with Intravenous induction. Maintenance will be Balanced.    PONV prophylaxis:  Ondansetron and Dexamethasone  Zofran, decadron  Propofol and sevoflurane    Patient did well with previous knee replacement under general anesthesia       Postoperative Care  Postoperative pain management:  Peripheral nerve block (Single Shot), IV analgesics and Oral pain medications.      Consents  Anesthetic plan, risks, benefits and alternatives discussed with:  Patient..                          .

## 2019-03-08 NOTE — PROGRESS NOTES
Admission medication history interview status for the 3/8/2019  admission is complete. See EPIC admission navigator for prior to admission medications     Medication history source reliability:Good    Medication history interview source(s):Patient    Medication history resources (including written lists, pill bottles, clinic record):Mailed in med list    Primary pharmacy.[facility]    Additional medication history information not noted on PTA med list :None    Time spent in this activity: 45 minutes    Prior to Admission medications    Medication Sig Last Dose Taking? Auth Provider   ALLOPURINOL PO Take 150 mg by mouth daily (Takes 0.5 x 300mg tablet = 150mg) 3/8/2019 at 0700 Yes Reported, Patient   amLODIPine (NORVASC) 10 MG tablet Take 10 mg by mouth every morning  3/8/2019 at 0700 Yes Reported, Patient   aspirin 81 MG EC tablet Take 81 mg by mouth every evening 2/22/2019 Yes Reported, Patient   atorvastatin (LIPITOR) 40 MG tablet Take 40 mg by mouth every evening 3/7/2019 at pm Yes Reported, Patient   carvedilol (COREG) 25 MG tablet Take 25 mg by mouth 2 times daily (with meals) 3/8/2019 at 0700 Yes Reported, Patient   cephALEXin (KEFLEX) 500 MG capsule Take 1,000 mg by mouth 2 times daily as needed (1 hour before and 1 hour after dental appointments) more than 2 weeks at prn Yes Reported, Patient   Coenzyme Q10 (COQ-10) 200 MG CAPS Take 200 mg by mouth every evening  3/7/2019 at pm Yes Reported, Patient   vitamin B-12 (CYANOCOBALAMIN) 1000 MCG tablet Take 1,000 mcg by mouth every morning 3/8/2019 at 0700 Yes Reported, Patient

## 2019-03-08 NOTE — BRIEF OP NOTE
[facility]    Brief Operative Note    Pre-operative diagnosis: RIGHT KNEE DJD  Post-operative diagnosis * No post-op diagnosis entered *  Procedure: Procedure(s):  RIGHT TOTAL KNEE ARTHROPLASTY  Surgeon: Surgeon(s) and Role:     * Alyssa Fernch MD - Primary     * Dorian Ward PA-C - Assisting  Anesthesia: General   Estimated blood loss: Less than 10 ml  Drains: hemavac  Specimens: * No specimens in log *  Findings:   None.  Complications: None.  Implants: Materials Involved:  Metalic  Grafts:  None.

## 2019-03-08 NOTE — PLAN OF CARE
Pt A/O X4. VSS on 2L, bradycardic. CMS intact, except baseline bilateral numbness LE. No dangle yet. IVF @75. Hemovac patent, ackerman patent. Pt denying pain at this time. Wife at bedside.

## 2019-03-09 ENCOUNTER — APPOINTMENT (OUTPATIENT)
Dept: PHYSICAL THERAPY | Facility: CLINIC | Age: 79
DRG: 470 | End: 2019-03-09
Attending: ORTHOPAEDIC SURGERY
Payer: COMMERCIAL

## 2019-03-09 LAB
GLUCOSE SERPL-MCNC: 158 MG/DL (ref 70–99)
HGB BLD-MCNC: 12.5 G/DL (ref 13.3–17.7)

## 2019-03-09 PROCEDURE — 97116 GAIT TRAINING THERAPY: CPT | Mod: GP | Performed by: PHYSICAL THERAPIST

## 2019-03-09 PROCEDURE — 97530 THERAPEUTIC ACTIVITIES: CPT | Mod: GP | Performed by: PHYSICAL THERAPIST

## 2019-03-09 PROCEDURE — 25000132 ZZH RX MED GY IP 250 OP 250 PS 637: Performed by: ORTHOPAEDIC SURGERY

## 2019-03-09 PROCEDURE — 97161 PT EVAL LOW COMPLEX 20 MIN: CPT | Mod: GP | Performed by: PHYSICAL THERAPIST

## 2019-03-09 PROCEDURE — 36415 COLL VENOUS BLD VENIPUNCTURE: CPT | Performed by: ORTHOPAEDIC SURGERY

## 2019-03-09 PROCEDURE — 25000128 H RX IP 250 OP 636: Performed by: ORTHOPAEDIC SURGERY

## 2019-03-09 PROCEDURE — 82947 ASSAY GLUCOSE BLOOD QUANT: CPT | Performed by: ORTHOPAEDIC SURGERY

## 2019-03-09 PROCEDURE — 85018 HEMOGLOBIN: CPT | Performed by: ORTHOPAEDIC SURGERY

## 2019-03-09 PROCEDURE — 12000000 ZZH R&B MED SURG/OB

## 2019-03-09 PROCEDURE — 97110 THERAPEUTIC EXERCISES: CPT | Mod: GP | Performed by: PHYSICAL THERAPIST

## 2019-03-09 RX ADMIN — OXYCODONE HYDROCHLORIDE 5 MG: 5 TABLET ORAL at 08:18

## 2019-03-09 RX ADMIN — ACETAMINOPHEN 975 MG: 325 TABLET, FILM COATED ORAL at 05:33

## 2019-03-09 RX ADMIN — ASPIRIN 325 MG: 325 TABLET, DELAYED RELEASE ORAL at 20:43

## 2019-03-09 RX ADMIN — CARVEDILOL 25 MG: 25 TABLET, FILM COATED ORAL at 17:47

## 2019-03-09 RX ADMIN — OXYCODONE HYDROCHLORIDE 5 MG: 5 TABLET ORAL at 04:00

## 2019-03-09 RX ADMIN — OXYCODONE HYDROCHLORIDE 10 MG: 5 TABLET ORAL at 16:22

## 2019-03-09 RX ADMIN — CYANOCOBALAMIN TAB 1000 MCG 1000 MCG: 1000 TAB at 08:18

## 2019-03-09 RX ADMIN — ACETAMINOPHEN 975 MG: 325 TABLET, FILM COATED ORAL at 22:16

## 2019-03-09 RX ADMIN — OXYCODONE HYDROCHLORIDE 5 MG: 5 TABLET ORAL at 12:31

## 2019-03-09 RX ADMIN — Medication 200 MG: at 20:42

## 2019-03-09 RX ADMIN — AMLODIPINE BESYLATE 10 MG: 10 TABLET ORAL at 08:18

## 2019-03-09 RX ADMIN — ACETAMINOPHEN 975 MG: 325 TABLET, FILM COATED ORAL at 14:30

## 2019-03-09 RX ADMIN — Medication 150 MG: at 08:18

## 2019-03-09 RX ADMIN — CEFAZOLIN SODIUM 2 G: 2 INJECTION, SOLUTION INTRAVENOUS at 03:39

## 2019-03-09 RX ADMIN — SENNOSIDES AND DOCUSATE SODIUM 2 TABLET: 8.6; 5 TABLET ORAL at 20:42

## 2019-03-09 RX ADMIN — OXYCODONE HYDROCHLORIDE 10 MG: 5 TABLET ORAL at 20:43

## 2019-03-09 RX ADMIN — SENNOSIDES AND DOCUSATE SODIUM 2 TABLET: 8.6; 5 TABLET ORAL at 08:19

## 2019-03-09 RX ADMIN — CARVEDILOL 25 MG: 25 TABLET, FILM COATED ORAL at 08:18

## 2019-03-09 RX ADMIN — ATORVASTATIN CALCIUM 40 MG: 20 TABLET, FILM COATED ORAL at 20:42

## 2019-03-09 RX ADMIN — ASPIRIN 325 MG: 325 TABLET, DELAYED RELEASE ORAL at 08:18

## 2019-03-09 ASSESSMENT — ACTIVITIES OF DAILY LIVING (ADL)
ADLS_ACUITY_SCORE: 14
ADLS_ACUITY_SCORE: 17

## 2019-03-09 NOTE — PLAN OF CARE
Pt A&O. VSS on 2L O2. CPAP @ night. Numbness LE baseline. Regular diet. Up with x1, gb&w. Pain managed with oxycodone. Knee immobilizer on. Dress CDI. Hem-vac patent. Will continue to monitor.

## 2019-03-09 NOTE — PLAN OF CARE
Patient sat at edge of bed. Walked to hallway with assist of 1 and walker. Tolerated well, denied nausea or lightheadedness. Pain managed with oxycodone. VSS on 2L N[place]; CPAP at hs. A&Ox4. Dressing CDI. Baseline neuropathy in BLE. Knee immobilizer on. Green intact and patent; adequate urine output. Hemovac intact and patent. Good PO intake. Plan to discharge home.

## 2019-03-09 NOTE — PLAN OF CARE
PT:  Patient admitted with elective right TKA.  Reports he has history of left TKA and has equipment at home including crutches, WW, and SEC.  Has OP PT set up at [facility].  Has pmh of MI, DM, CAD, and bladder cancer.  At baseline patient lives with wife in house with 3 steps with rail to enter.  Is able to stay on one level though his bedroom is up one flight of steps.  Wife able to assist at home and drive to OP PT.  At baseline patient does not use an AD and is active with weight lifting and using elliptcal.   Discharge Planner PT   Patient plan for discharge: Home with wife and OP PT.   Current status: Min A for sit to and from supine and sit to and from stand .  Ambulated 100' with WW and CGA plus Min A for equipment.  O2 sats dropping to 83% on RA; 2L of O2 added per nursing and O2 sats improved to high 80's and low 90's. KI in place at all times.   Barriers to return to prior living situation: Min A, steps to enter, pain  Recommendations for discharge: Home with wife and OP PT  Rationale for recommendations: Anticipate wife will be able to assist and patient will mobilize well with WW to manage at home.  Recommend OP PT to advance ROM, strength and functional mobility.        Entered by: Margaux Shell 03/09/2019 2:53 PM

## 2019-03-09 NOTE — PLAN OF CARE
Pt A/O x4. O2 varies, BP elevated. CMS intact, except bilateral numbness in LE. Dressing CDI, hemovac patent. A1 with walker, voiding in urinal. Managing pain with oxycodone. Continue to monitor.

## 2019-03-09 NOTE — PROGRESS NOTES
"Imer Burns  3/9/2019  POD # 1 sp tka    Admit Date:  3/8/2019      Doing well.  Normal healing wound.  No immediate surgical complications identified.  No excessive bleeding  Pain well-controlled.  Tolerating physical therapy and rehabilitation well.  Objective:  Blood pressure 169/74, pulse 76, temperature 98.6  F (37  C), temperature source Oral, resp. rate 16, height 1.778 m (5' 10\"), weight 84.8 kg (187 lb), SpO2 91 %.    Temperatures:  Current - Temp: 98.6  F (37  C); Max - Temp  Av.7  F (36.5  C)  Min: 97.3  F (36.3  C)  Max: 98.6  F (37  C)  Pulse range: Pulse  Av.1  Min: 49  Max: 76  Blood pressure range: Systolic (24hrs), Av , Min:116 , Max:169   ; Diastolic (24hrs), Av, Min:59, Max:103    Exam:  CMS: intact  alert, stable, wound ok  Calf nontender b le.       Labs:  Recent Labs   Lab Test 19  0928 16  0659 16  0553   POTASSIUM 4.3 3.7 4.2     Recent Labs   Lab Test 19  0617 19  0928 16  0652   HGB 12.5* 14.8 11.5*     No results for input(s): INR in the last 31436 hours.  No results for input(s): PLT in the last 41429 hours.    PLAN: Weight bearing as tolerated  Continue physical therapy  Pain control measures  Additional problems to be followed by medicine physician  Discharge to home .       "

## 2019-03-09 NOTE — PROGRESS NOTES
03/09/19 1324   Quick Adds   Type of Visit Initial PT Evaluation   Living Environment   Lives With spouse   Living Arrangements house   Home Accessibility stairs to enter home   Number of Stairs, Main Entrance three   Stair Railings, Main Entrance railings safe and in good condition   Transportation Anticipated family or friend will provide   Living Environment Comment will stay on one level; does have stairs to second floor   Self-Care   Usual Activity Tolerance good   Current Activity Tolerance moderate   Regular Exercise Yes   Activity/Exercise Type strength training;other (see comments)  (elliptical)   Exercise Amount/Frequency 3-5 times/wk   Equipment Currently Used at Home none  (has WW, SEC, crutches)   Functional Level Prior   Ambulation 0-->independent   Transferring 0-->independent   Toileting 0-->independent   Bathing 0-->independent   Communication 0-->understands/communicates without difficulty   Swallowing 0-->swallows foods/liquids without difficulty   Cognition 0 - no cognition issues reported   Fall history within last six months no   Which of the above functional risks had a recent onset or change? ambulation;transferring;toileting;bathing;dressing   Prior Functional Level Comment has had left TKA in the past   General Information   Onset of Illness/Injury or Date of Surgery - Date 03/08/19   Referring Physician Alyssa French MD   Patient/Family Goals Statement Wants to discharge to home at D/C   Pertinent History of Current Problem (include personal factors and/or comorbidities that impact the POC) Admitted for elective right TKA.  Has pmh of MI, DM, CAD, and bladder cancer   Precautions/Limitations fall precautions   Weight-Bearing Status - RLE weight-bearing as tolerated   Cognitive Status Examination   Orientation orientation to person, place and time   Level of Consciousness alert   Follows Commands and Answers Questions 100% of the time   Personal Safety and Judgment intact   Memory  intact   Pain Assessment   Patient Currently in Pain Yes, see Vital Sign flowsheet  (9/10 with flexion)   Integumentary/Edema   Integumentary/Edema Comments generalized edema under bandages   Posture    Posture Not impaired   Range of Motion (ROM)   ROM Comment right knee flexion to 45 degrees; extension approx. 8   Strength   Strength Comments not formally assessed; unable to SLR on right   Bed Mobility   Bed Mobility Comments Min A for supine to sit at EOB   Transfer Skills   Transfer Comments Sit to and from stand with Min A and cueing   Gait   Gait Comments Patient ambulated 15' for eval with WW and CGA plus assist for IV and lines including O2.  Patient was on 1.5 L of O2 while supine.  Nursing advised to try without but patient dropped to 83%.  O2 added at 2L and sats between 88-95% consistently.  Improved with PLB which patient was educated on.    Coordination   Coordination no deficits were identified   Muscle Tone   Muscle Tone no deficits were identified   General Therapy Interventions   Planned Therapy Interventions bed mobility training;gait training;ROM;strengthening;transfer training;progressive activity/exercise   Clinical Impression   Criteria for Skilled Therapeutic Intervention yes, treatment indicated   PT Diagnosis impaired mobility   Influenced by the following impairments pain, decreased O2 sats, fatigue, decreased ROM of right L/E   Functional limitations due to impairments decreased independence in functional mobiltiy   Clinical Presentation Stable/Uncomplicated   Clinical Presentation Rationale per clinical judgment   Clinical Decision Making (Complexity) Low complexity   Therapy Frequency` 2 times/day   Predicted Duration of Therapy Intervention (days/wks) 3 days   Anticipated Equipment Needs at Discharge (has SEC, WW, crutches)   Anticipated Discharge Disposition Home with Assist;Home with Outpatient Therapy   Risk & Benefits of therapy have been explained Yes   Patient, Family & other  "staff in agreement with plan of care Yes   [facility] AM-PAC  \"6 Clicks\" V.2 Basic Mobility Inpatient Short Form   1. Turning from your back to your side while in a flat bed without using bedrails? 3 - A Little   2. Moving from lying on your back to sitting on the side of a flat bed without using bedrails? 3 - A Little   3. Moving to and from a bed to a chair (including a wheelchair)? 3 - A Little   4. Standing up from a chair using your arms (e.g., wheelchair, or bedside chair)? 3 - A Little   5. To walk in hospital room? 3 - A Little   6. Climbing 3-5 steps with a railing? 3 - A Little   Basic Mobility Raw Score (Score out of 24.Lower scores equate to lower levels of function) 18   Total Evaluation Time   Total Evaluation Time (Minutes) 15     "

## 2019-03-09 NOTE — OP NOTE
Procedure Date: 03/08/2019      PREOPERATIVE DIAGNOSIS:  Osteoarthritis of the right knee.      POSTOPERATIVE DIAGNOSIS:  Osteoarthritis of the right knee.      PROCEDURE PERFORMED:  Right total knee arthroplasty using the DePuy Attune knee system, posterior stabilized rotating platform, size 7 femur, 7 tibia, 7 mm of tibial polyethylene and a 41 mm patellar button.      INDICATIONS FOR PROCEDURE:  The patient is a 78-year-old male with a long history of bilateral osteoarthritis of the knee.  He previously had a left total knee arthroplasty and did very well.  I just saw the patient in consultation and discussed treatment options with him.  I explained to him the risks, benefits and complications of total knee arthroplasty.  This discussion included that specific to infection, vascular neurologic complications, DVT, septic and aseptic loosening, arthrofibrosis, fracture and the possible need for further revision surgery.  After this discussion, the patient wanted to proceed with surgery.      ANESTHESIA:  General.      ASSISTANT: SOLOMON US PA-C      PROCEDURE:  The patient was taken to the operating room and placed on the operating table in the supine position.  After adequate induction of general anesthetic, a bump was placed beneath the right hip.  Pneumatic tourniquet was applied to the right thigh.  The patient was given 2 grams of Ancef for infection prophylaxis given 1 hour prior to incision.  We then performed sterile prep and drape of the right knee and right lower extremity.  After sterile prep and drape, the limb was exsanguinated, and tourniquet was elevated to 300 mmHg.  I then made a midline incision to expose the extensor mechanism and proceeded with a medial parapatellar arthrotomy with quad-splitting approach.  I then identified my entry point for the intramedullary guide at the femur, set at 5 degrees of valgus, and made my distal femoral cut.  We then sized the femur appropriately and applied  the jig for 3-6 rotations of femoral component.  We then made our anterior and posterior cuts along with the anterior and posterior chamfers.  We then directed our attention to the tibia.  We used an extramedullary guide to establish a neutral cut of the tibia.  Once the tibial cut was made, we checked our flexion and extension gaps and found them to be equal.  Then we finished preparation of the femur, making the box, and finished preparation of the tibia.  We incised the patella and made a patellar cut to finish preparation of the patella.  While the cement was being mixed, we began preparing the cement surfaces using pulsatile jet lavage and antibiotic saline.  Once the cement was of appropriate consistency, we cemented first the tibial component from the femoral component.  Once the components were fully seated, excess cement was removed using a [place] elevator.  I then placed the knee in full extension with a trial polyethylene in place and allowed the cement to harden.  At this point, we sized the patella.  At this point, we cemented the patella, again removing excess cement using [place] elevator.  Once the cement was hardening, we soaked the knee in a dilute solution of Betadine for 3 minutes.  We then irrigated again using pulse jet lavage.  Once the cement hardened, we took the knee through range of motion.  Patella tracked ideally with a good soft tissue balance in flexion and extension.  Then, we removed the trial polyethylene.  We inspected the joint for loose bone cement and removed it when was found.  We then put in the actual rotating platform tibial polyethylene, reduced the knee, and took it through a range of motion.  The patella tracked ideally.  We then placed the knee in approximately 30 degrees of flexion, put a medium VAC drain deep to the fascia, and closed the arthrotomy using 0 Ethibond sutures.  This was oversewn using 0 STRATAFIX.  The deep subcutaneous was closed using 0 Vicryl.   Superficial was closed with 2-0 Vicryl.  Skin was closed with a running 3-0 Monocryl subcuticular stitch, followed by a PRINEO dressing.  A sterile dressing was applied, followed by a knee immobilizer.  The patient tolerated the operative procedure.  There were no intraoperative complications.  The patient was sent to the [facility] in stable condition.  Plan will be for the patient to get 24 hours of Ancef for infection prophylaxis and 30 days of aspirin for DVT prophylaxis.         SYBIL DANIELS MD             D: 2019   T: 2019   MT:       Name:     MARCELL ONEILL   MRN:      [id number]        Account:        [id number]   :      1940           Procedure Date: 2019      Document: [id number]

## 2019-03-10 ENCOUNTER — APPOINTMENT (OUTPATIENT)
Dept: OCCUPATIONAL THERAPY | Facility: CLINIC | Age: 79
DRG: 470 | End: 2019-03-10
Attending: ORTHOPAEDIC SURGERY
Payer: COMMERCIAL

## 2019-03-10 ENCOUNTER — APPOINTMENT (OUTPATIENT)
Dept: PHYSICAL THERAPY | Facility: CLINIC | Age: 79
DRG: 470 | End: 2019-03-10
Attending: ORTHOPAEDIC SURGERY
Payer: COMMERCIAL

## 2019-03-10 VITALS
TEMPERATURE: 98.1 F | RESPIRATION RATE: 14 BRPM | HEIGHT: 70 IN | HEART RATE: 61 BPM | OXYGEN SATURATION: 92 % | WEIGHT: 187 LBS | SYSTOLIC BLOOD PRESSURE: 154 MMHG | DIASTOLIC BLOOD PRESSURE: 71 MMHG | BODY MASS INDEX: 26.77 KG/M2

## 2019-03-10 LAB
GLUCOSE SERPL-MCNC: 137 MG/DL (ref 70–99)
HGB BLD-MCNC: 12.6 G/DL (ref 13.3–17.7)

## 2019-03-10 PROCEDURE — 36415 COLL VENOUS BLD VENIPUNCTURE: CPT | Performed by: ORTHOPAEDIC SURGERY

## 2019-03-10 PROCEDURE — 82947 ASSAY GLUCOSE BLOOD QUANT: CPT | Performed by: ORTHOPAEDIC SURGERY

## 2019-03-10 PROCEDURE — 97110 THERAPEUTIC EXERCISES: CPT | Mod: GP | Performed by: PHYSICAL THERAPIST

## 2019-03-10 PROCEDURE — 97116 GAIT TRAINING THERAPY: CPT | Mod: GP | Performed by: PHYSICAL THERAPIST

## 2019-03-10 PROCEDURE — 85018 HEMOGLOBIN: CPT | Performed by: ORTHOPAEDIC SURGERY

## 2019-03-10 PROCEDURE — 97165 OT EVAL LOW COMPLEX 30 MIN: CPT | Mod: GO

## 2019-03-10 PROCEDURE — 25000132 ZZH RX MED GY IP 250 OP 250 PS 637: Performed by: ORTHOPAEDIC SURGERY

## 2019-03-10 PROCEDURE — 97530 THERAPEUTIC ACTIVITIES: CPT | Mod: GP | Performed by: PHYSICAL THERAPIST

## 2019-03-10 PROCEDURE — 40000894 ZZH STATISTIC OT IP EVAL DEFER

## 2019-03-10 RX ORDER — HYDROXYZINE HYDROCHLORIDE 10 MG/1
10 TABLET, FILM COATED ORAL EVERY 6 HOURS PRN
Qty: 30 TABLET | Refills: 0 | Status: SHIPPED | OUTPATIENT
Start: 2019-03-10

## 2019-03-10 RX ORDER — ASPIRIN 325 MG
325 TABLET, DELAYED RELEASE (ENTERIC COATED) ORAL 2 TIMES DAILY
Qty: 60 TABLET | Refills: 0 | Status: SHIPPED | OUTPATIENT
Start: 2019-03-10

## 2019-03-10 RX ORDER — AMOXICILLIN 250 MG
1 CAPSULE ORAL 2 TIMES DAILY
Qty: 30 TABLET | Refills: 0 | Status: SHIPPED | OUTPATIENT
Start: 2019-03-10

## 2019-03-10 RX ORDER — ONDANSETRON 4 MG/1
4 TABLET, ORALLY DISINTEGRATING ORAL EVERY 6 HOURS PRN
Qty: 30 TABLET | Refills: 0 | Status: SHIPPED | OUTPATIENT
Start: 2019-03-10

## 2019-03-10 RX ADMIN — AMLODIPINE BESYLATE 10 MG: 10 TABLET ORAL at 08:30

## 2019-03-10 RX ADMIN — ASPIRIN 325 MG: 325 TABLET, DELAYED RELEASE ORAL at 08:30

## 2019-03-10 RX ADMIN — SENNOSIDES AND DOCUSATE SODIUM 2 TABLET: 8.6; 5 TABLET ORAL at 08:29

## 2019-03-10 RX ADMIN — ACETAMINOPHEN 975 MG: 325 TABLET, FILM COATED ORAL at 07:04

## 2019-03-10 RX ADMIN — OXYCODONE HYDROCHLORIDE 10 MG: 5 TABLET ORAL at 00:50

## 2019-03-10 RX ADMIN — OXYCODONE HYDROCHLORIDE 10 MG: 5 TABLET ORAL at 08:29

## 2019-03-10 RX ADMIN — ACETAMINOPHEN 975 MG: 325 TABLET, FILM COATED ORAL at 13:13

## 2019-03-10 RX ADMIN — CYANOCOBALAMIN TAB 1000 MCG 1000 MCG: 1000 TAB at 08:30

## 2019-03-10 RX ADMIN — CARVEDILOL 25 MG: 25 TABLET, FILM COATED ORAL at 08:29

## 2019-03-10 RX ADMIN — Medication 150 MG: at 08:30

## 2019-03-10 RX ADMIN — OXYCODONE HYDROCHLORIDE 10 MG: 5 TABLET ORAL at 13:13

## 2019-03-10 ASSESSMENT — ACTIVITIES OF DAILY LIVING (ADL)
ADLS_ACUITY_SCORE: 16
ADLS_ACUITY_SCORE: 16
ADLS_ACUITY_SCORE: 17
ADLS_ACUITY_SCORE: 17

## 2019-03-10 NOTE — PLAN OF CARE
.Physical Therapy Discharge Summary    Reason for therapy discharge:    Discharged to home with outpatient therapy.    Progress towards therapy goal(s). See goals on Care Plan in [facility] electronic health record for goal details.  Goals partially met.  Barriers to achieving goals:   discharge from facility.    Therapy recommendation(s):    Continued therapy is recommended.  Rationale/Recommendations:  Outpatient PT is recommended to advance ROM, strength and functional mobility.

## 2019-03-10 NOTE — PLAN OF CARE
Discharge Planner OT   Patient plan for discharge: home with spouse  Current status: SBA for sit<>stand transfers and ambulation with RW.  Independent with upper body self-cares including dressing and hygiene.  SBA for toileting. Min A for LB dressing.  Barriers to return to prior living situation: pain and change in weightbearing status  Recommendations for discharge: home with spouse  Rationale for recommendations: Familiar with TKA recovery, has previously had L done.  Has necessary DME.  Wife can provide 24 hour supervision and support.       Entered by: Carmen Goode 03/10/2019 2:22 PM

## 2019-03-10 NOTE — PLAN OF CARE
Pt A/O X4. VSS on RA. Baseline bilateral numbness LE. A1 with walker. Managing pain with oxycodone. Reviewed AVS with pt and spouse. No questions or concerns at this time. Discharging at 1530.

## 2019-03-10 NOTE — PLAN OF CARE
Patient up with SBA and walker; ambulated in halls this evening.  Pain managed with Oxycodone.  VSS on RA; CPAP @HS with 1L NC.  A/Ox4.  Dressing CDI.  CMS intact, except for baseline neuropathy.  Hemovac intact and patent.  Knee immobilizer on.  JAMES on LLE.  Plan to discharge home tomorrow.

## 2019-03-10 NOTE — PROGRESS NOTES
03/10/19 1000   Quick Adds   Type of Visit Initial Occupational Therapy Evaluation   Living Environment   Lives With spouse   Living Arrangements house   Home Accessibility stairs to enter home;stairs within home   Number of Stairs, Main Entrance three   Number of Stairs, Within Home, Primary other (see comments)   Stair Railings, Within Home, Primary railings safe and in good condition   Transportation Anticipated family or friend will provide   Living Environment Comment Will stay on one level   Self-Care   Usual Activity Tolerance good   Current Activity Tolerance moderate   Regular Exercise Yes   Activity/Exercise Type other (see comments);strength training  (eliptical)   Exercise Amount/Frequency 3-5 times/wk   Equipment Currently Used at Home crutches;cane, straight;walker, rolling;shower chair;grab bar, tub/shower   Functional Level   Ambulation 0-->independent   Transferring 0-->independent   Toileting 0-->independent   Bathing 0-->independent   Dressing 0-->independent   Eating 0-->independent   Communication 0-->understands/communicates without difficulty   Swallowing 0-->swallows foods/liquids without difficulty   Cognition 0 - no cognition issues reported   Fall history within last six months no   Which of the above functional risks had a recent onset or change? ambulation;transferring;toileting;bathing;dressing   Prior Functional Level Comment Has had L TKA in the past   General Information   Referring Physician Dr. French   Patient/Family Goals Statement Return home   Weight-Bearing Status - RLE weight-bearing as tolerated   Cognitive Status Examination   Orientation orientation to person, place and time   Level of Consciousness lethargic/somnolent;other (see comments)  (Patient apologized, but had difficulty keeping his eyes open)   Follows Commands (Cognition) WNL   Memory intact   Organization/Problem Solving No deficits were identified   Executive Function No deficits were identified   Pain  "Assessment   Patient Currently in Pain Yes, see Vital Sign flowsheet   Coordination   Upper Extremity Coordination No deficits were identified   Gross Motor Coordination WFL   Fine Motor Coordination WFL   Mobility   Bed Mobility Bed mobility skill: Sit to supine;Bed mobility skill: Scooting/Bridging   Bed Mobility Skill: Scooting/Bridging   Level of Heard: Scooting/Bridging contact guard   Physical Assist/Nonphysical Assist: Scooting/Bridging verbal cues;supervision   Bed Mobility Skill: Sit to Supine   Level of Heard: Sit/Supine minimum assist (75% patients effort)   Physical Assist/Nonphysical Assist: Sit/Supine 1 person assist;verbal cues;supervision   Transfer Skills   Transfer Transfer Safety Analysis Sit/Stand   Transfer Skill: Sit to Stand   Level of Heard: Sit/Stand stand-by assist   Physical Assist/Nonphysical Assist: Sit/Stand 1 person assist   Transfer Skill: Sit to Stand weight-bearing as tolerated   Assistive Device for Transfer: Sit/Stand rolling walker   Clinical Impression   Criteria for Skilled Therapeutic Interventions Met yes, treatment indicated   OT Diagnosis Decreased ADL independence   Influenced by the following impairments R LE pain   Assessment of Occupational Performance 1-3 Performance Deficits   Identified Performance Deficits ambulation, transfers, bathing, toileting, dressing.   Clinical Decision Making (Complexity) Low complexity   Therapy Frequency 2 times/day   Predicted Duration of Therapy Intervention (days/wks) 2 days   Anticipated Discharge Disposition Home   Risks and Benefits of Treatment have been explained. Yes   Patient, Family & other staff in agreement with plan of care Yes   [facility] AM-PAC  \"6 Clicks\" Daily Activity Inpatient Short Form   1. Putting on and taking off regular lower body clothing? 3 - A Little   2. Bathing (including washing, rinsing, drying)? 3 - A Little   3. Toileting, which includes using toilet, bedpan or urinal? 3 - A " Little   4. Putting on and taking off regular upper body clothing? 4 - None   5. Taking care of personal grooming such as brushing teeth? 4 - None   6. Eating meals? 4 - None   Daily Activity Raw Score (Score out of 24.Lower scores equate to lower levels of function) 21   Total Evaluation Time   Total Evaluation Time (Minutes) 10

## 2019-03-10 NOTE — PLAN OF CARE
Discharge Planner PT   Patient plan for discharge: Home with wife and OP PT (patient indicated he does not have OP PT for two weeks he thought.  Patient advised that he needs to have OP PT beginning the next two days.  Patient's plan is to call OP PT to schedule earlier appointments and follow-up with physician if he runs into difficulty.  Current status: Patient in bathroom upon PT entering.  Provided CGA for sit to stand off toilet and to mobilize out of bathroom.  Stood in WW and assisted with dandy gupta.  Took 5-6 steps to bedside tray with WW and CGA to answer phone.  After he talked with his wife he complained of feeling nauseated. Assisted to wc.  /85; O2 sats on RA 90-91%, HR 74.  Nausea resolved.  Nursing present to offer meds and patient declined. Felt better and able to walk to [employer] with CGA..  Sit to and from supine with Min A for L/E's.  Sit to and from stand with CGA and cueing.  Tends to want to leave walker too early and then pivot. Instructed on fully turning prior to sitting. Patient ambulated 125' x 2 with WW and CGA; cueing to stand erect; antalgic on right.  Ambulated up and down 3 steps with rail and SEC, to mimic home environment, with CGA.  Barriers to return to prior living situation: CGA, steps to enter, pain, decreased ROM  Recommendations for discharge: Home with OP PT  Rationale for recommendations: Anticipate wife will be able to assist and patient will mobilize well with WW to manage at home.  Recommend OP PT to advance ROM, strength and functional mobility.        Entered by: Margaux Shell 03/10/2019 10:13 AM

## 2019-03-10 NOTE — PLAN OF CARE
Patient A&Ox4. VSS on 1.5L NC with CPAP at HS. CMS intact, except baseline BLE neuropathy. Dressing clean, dry, and intact. Hemovac intact and patent. Pain managed with PRN Oxycodone. Up with SBA. Voiding per urinal and incontinent at times, voiding encouraged d/t hx neuroplastic bladder. Patient slept between cares. Will continue to monitor.

## 2019-03-10 NOTE — DISCHARGE SUMMARY
"Orthopedic Discharge Summary   Patient: Imer Burns  Admission Date: 3/8/2019  Discharge Date: 3/10/19  Date of Service: 3/10/2019  Attending Provider: Alyssa French MD  Admission Diagnosis: RIGHT KNEE DJD  H/O total knee replacement, right  Discharge Diagnosis: right knee osteoarthritis  Procedures Performed: right total knee replacement  Complications: None apparent   History of Present Illness: see operative report for full HPI  Past Medical History:   Past Medical History:   Diagnosis Date     Anemia     iron deficiency anemia     Cancer (H)     bladder cancer     Cardiovascular arteriosclerosis      Coronary artery disease     \"one fully blocked coronary artery\"     Diabetes (H)     \"pre-diabetic\"     Diplopia      Gout      Hearing loss      Heart attack (H)      History of back pain      History of blood transfusion     1967     History of compression fracture of spine      Hypercalcemia      Lung nodules      Numbness and tingling     tingiling in feet bilaterally     Other chronic pain      Personal history of malignant neoplasm of bladder      Pure hypercholesterolemia      Renal artery stenosis (H)      Renovascular hypertension      Sleep apnea     just got CPAP 11/2/16     Vitamin D deficiency      Patient Active Problem List   Diagnosis     Status post total knee replacement, left     ACP (advance care planning)     H/O total knee replacement, right     Past Surgical History:   Procedure Laterality Date     ARTHROPLASTY KNEE Left 11/4/2016    Procedure: ARTHROPLASTY KNEE;  Surgeon: Alyssa French MD;  Location: [facility] OR     caTARACTS       CHOLECYSTECTOMY       GENITOURINARY SURGERY      Radu bladder - wants catheter     ORTHOPEDIC SURGERY      ORIF left femur     Social History     Socioeconomic History     Marital status:      Spouse name: Not on file     Number of children: Not on file     Years of education: Not on file     Highest education level: Not on file   Occupational History "     Not on file   Social Needs     Financial resource strain: Not on file     Food insecurity:     Worry: Not on file     Inability: Not on file     Transportation needs:     Medical: Not on file     Non-medical: Not on file   Tobacco Use     Smoking status: Former Smoker     Smokeless tobacco: Never Used     Tobacco comment: quit 1985, social use up until 5 years ago   Substance and Sexual Activity     Alcohol use: Yes     Comment: glass wine daily on average     Drug use: No     Sexual activity: Not on file   Lifestyle     Physical activity:     Days per week: Not on file     Minutes per session: Not on file     Stress: Not on file   Relationships     Social connections:     Talks on phone: Not on file     Gets together: Not on file     Attends Bahai service: Not on file     Active member of club or organization: Not on file     Attends meetings of clubs or organizations: Not on file     Relationship status: Not on file     Intimate partner violence:     Fear of current or ex partner: Not on file     Emotionally abused: Not on file     Physically abused: Not on file     Forced sexual activity: Not on file   Other Topics Concern     Parent/sibling w/ CABG, MI or angioplasty before 65F 55M? Not Asked   Social History Narrative     Not on file     Medications on admission:   Medications Prior to Admission   Medication Sig Dispense Refill Last Dose     ALLOPURINOL PO Take 150 mg by mouth daily (Takes 0.5 x 300mg tablet = 150mg)   3/8/2019 at 0700     amLODIPine (NORVASC) 10 MG tablet Take 10 mg by mouth every morning    3/8/2019 at 0700     atorvastatin (LIPITOR) 40 MG tablet Take 40 mg by mouth every evening   3/7/2019 at pm     carvedilol (COREG) 25 MG tablet Take 25 mg by mouth 2 times daily (with meals)   3/8/2019 at 0700     cephALEXin (KEFLEX) 500 MG capsule Take 1,000 mg by mouth 2 times daily as needed (1 hour before and 1 hour after dental appointments)   more than 2 weeks at prn     Coenzyme Q10 (COQ-10)  200 MG CAPS Take 200 mg by mouth every evening    3/7/2019 at pm     vitamin B-12 (CYANOCOBALAMIN) 1000 MCG tablet Take 1,000 mcg by mouth every morning   3/8/2019 at 0700     [DISCONTINUED] aspirin 81 MG EC tablet Take 81 mg by mouth every evening   2/22/2019     Allergies:    Allergies   Allergen Reactions     Lisinopril Swelling     Nsaids        Hospital Course: Patient was admitted to Orthopedics and brought to the OR on where he underwent the above named procedure. Postoperatively he did very well with no apparent complications, and he had an uneventful hospital stay. Ancef was given for 24 hours after surgery. He was given aspirin for DVT prophylaxis and his pain was well controlled with oral medications, then transitioned to oral pain medications during his hospital stay. He progressed well with physical therapy and discharge to home was recommended. His chronic medical problems were followed by the medicine team during his hospital stay and there were no significant changes to conditions or treatment plans. No new medical problems presented during his hospital stay.   Consultations Obtained During Hospitalization:  1. Internal medicine for management of comorbid conditions and home medication management.  2. Physical Therapy for gait training, mobilization, range of motion and strengthening exercises  3. Occupational Therapy for activities of daily living.  4. Social Work for disposition planning.  Active Problems:    H/O total knee replacement, right    Discharge Disposition: patient improving  Discharge Diet: resume normal pre op diet  Discharge Medications:   Current Discharge Medication List      START taking these medications    Details   hydrOXYzine (ATARAX) 10 MG tablet Take 1 tablet (10 mg) by mouth every 6 hours as needed for itching  Qty: 30 tablet, Refills: 0    Associated Diagnoses: H/O total knee replacement, right      ondansetron (ZOFRAN-ODT) 4 MG ODT tab Take 1 tablet (4 mg) by mouth every 6  hours as needed for nausea or vomiting  Qty: 30 tablet, Refills: 0    Associated Diagnoses: H/O total knee replacement, right      oxyCODONE (ROXICODONE) 5 MG tablet Take 1-2 tablets (5-10 mg) by mouth every 4 hours as needed for breakthrough pain  Qty: 30 tablet, Refills: 0    Associated Diagnoses: H/O total knee replacement, right      senna-docusate (SENOKOT-S/PERICOLACE) 8.6-50 MG tablet Take 1 tablet by mouth 2 times daily  Qty: 30 tablet, Refills: 0    Associated Diagnoses: H/O total knee replacement, right         CONTINUE these medications which have CHANGED    Details   aspirin (ASA) 325 MG EC tablet Take 1 tablet (325 mg) by mouth 2 times daily  Qty: 60 tablet, Refills: 0    Associated Diagnoses: H/O total knee replacement, right         CONTINUE these medications which have NOT CHANGED    Details   ALLOPURINOL PO Take 150 mg by mouth daily (Takes 0.5 x 300mg tablet = 150mg)      amLODIPine (NORVASC) 10 MG tablet Take 10 mg by mouth every morning       atorvastatin (LIPITOR) 40 MG tablet Take 40 mg by mouth every evening      carvedilol (COREG) 25 MG tablet Take 25 mg by mouth 2 times daily (with meals)      cephALEXin (KEFLEX) 500 MG capsule Take 1,000 mg by mouth 2 times daily as needed (1 hour before and 1 hour after dental appointments)      Coenzyme Q10 (COQ-10) 200 MG CAPS Take 200 mg by mouth every evening       vitamin B-12 (CYANOCOBALAMIN) 1000 MCG tablet Take 1,000 mcg by mouth every morning           Code Status:   X-rays needed at the follow up visit:   Dorian Ward PA-C  3/10/2019 8:05 AM   JANINE Schmitt  [phone]

## 2019-03-11 NOTE — ADDENDUM NOTE
Addendum  created 03/11/19 0709 by Adela Abad    Child order released for a procedure order, Intraprocedure Blocks edited, Sign clinical note

## 2019-03-11 NOTE — ANESTHESIA PROCEDURE NOTES
Peripheral nerve/Neuraxial procedure note : femoral and Adductor canal  Pre-Procedure  Performed by Adela Abad  Location: pre-op      Pre-Anesthestic Checklist: patient identified, IV checked, site marked, risks and benefits discussed, informed consent, monitors and equipment checked, at physician/surgeon's request and post-op pain management    Timeout  Correct Patient: Yes   Correct Procedure: Yes   Correct Site: Yes   Correct Laterality: Yes   Correct Position: Yes   Site Marked: Yes   .   Procedure Documentation    .    Procedure:    Femoral and Adductor canal.  Local skin infiltrated with 3 mL of 1% lidocaine.     Ultrasound used to identify targeted nerve, plexus, or vascular marker and placed a needle adjacent to it., Ultrasound was used to visualize the spread of the anesthetic in close proximity to the above stated nerve. A permanent image is entered into the patient's record.  Patient Prep;mask, sterile gloves, chlorhexidine gluconate and isopropyl alcohol, patient draped.  Nerve Stim: Initial Level 1 mA. Lowest motor response mA..  Needle: insulated, short bevel Needle Gauge: 20.    Needle Length (Inches) 2  .       Assessment/Narrative  Paresthesias: No.  .  The placement was negative for: blood aspirated, painful injection and site bleeding.  Bolus given via needle..   Secured via.   Complications: none. Comments:  Single shot femoral nerve block  20 ml 0.5% Bupivacaine with 1:400,000 Epinephrine

## 2020-09-14 ENCOUNTER — THERAPY VISIT (OUTPATIENT)
Dept: PHYSICAL THERAPY | Facility: CLINIC | Age: 80
End: 2020-09-14
Payer: COMMERCIAL

## 2020-09-14 DIAGNOSIS — M25.512 CHRONIC PAIN OF BOTH SHOULDERS: ICD-10-CM

## 2020-09-14 DIAGNOSIS — G89.29 CHRONIC PAIN OF BOTH SHOULDERS: ICD-10-CM

## 2020-09-14 DIAGNOSIS — M25.511 CHRONIC PAIN OF BOTH SHOULDERS: ICD-10-CM

## 2020-09-14 PROCEDURE — 97112 NEUROMUSCULAR REEDUCATION: CPT | Mod: GP | Performed by: PHYSICAL THERAPIST

## 2020-09-14 PROCEDURE — 97161 PT EVAL LOW COMPLEX 20 MIN: CPT | Mod: GP | Performed by: PHYSICAL THERAPIST

## 2020-09-14 PROCEDURE — 97110 THERAPEUTIC EXERCISES: CPT | Mod: GP | Performed by: PHYSICAL THERAPIST

## 2020-09-14 NOTE — PROGRESS NOTES
[facility] Initial Evaluation  Subjective:  The history is provided by the patient. No  was used.   Therapist Generated HPI Evaluation  Problem details: 8/27/20 pt saw Camila at [facility] for progressive B shoulder pain.  MD dx of primary OA of R shoulder and left shoulder impingement.  He received injections to both shoulders with significant relief.  Pt is right handed..         Type of problem:  Bilateral shoulders.    This is a chronic condition.  Condition occurred with:  Degenerative joint disease.  Where condition occurred: for unknown reasons.  Patient reports pain:  Lateral and anterior.  Pain is described as aching and sharp and is intermittent.  Pain radiates to:  Upper arm. Pain is the same all the time.  Since onset symptoms are gradually improving.  Symptoms are exacerbated by using arm at shoulder level, using arm overhead and using arm behind back (exercise, golf)  and relieved by rest.      Barriers include:  None as reported by patient.    Patient Health History  Imer Burns being seen for B shoulder pain.     Problem began: 8/27/2020.   Problem occurred: ddd   Pain score: 0-5/10.  General health as reported by patient is good.  Pertinent medical history includes: none.   Red flags:  None as reported by patient.  Medical allergies: other. Other medical allergies details: lisinopril.   Surgeries include:  Orthopedic surgery and cancer surgery. Other surgery history details: B TKA, femur; bladder cancer.    Current medications:  High blood pressure medication and other. Other medications details: blood thinner.    Current occupation is retired.   Primary job tasks include:  Driving and lifting/carrying.                                    Objective:  Standing Alignment:      Shoulder/UE:  Scapular winging L and rounded shoulders                                       Shoulder Evaluation:  ROM:  AROM:    Flexion:  Left:  153    Right:  107    Abduction:  Left: 154    Right:  102 with pain and crepitus      External Rotation:  Left:  85    Right:  56          Flexion/External Rotation:  Left:  T3    Right:  T1  Extension/Internal Rotation:  Left:  T8    Right:  L4          Strength:    Flexion: Left:5/5   Pain:    Right: 5/5     Pain:   Extension:  Left: 5/5    Pain:    Right: 5/5    Pain:  Abduction:  Left: 4+/5  Pain:    Right: 5/5     Pain:  Adduction:  Left: 5/5    Pain:    Right: 5/5     Pain:  Internal Rotation:  Left:5/5     Pain:    Right: 5/5     Pain:  External Rotation:   Left:4/5     Pain:   Right:4/5     Pain:              Special Tests:      Left shoulder negative for the following special tests:  Impingement  Right shoulder positive for the following special tests:Impingement (pain and tightness to all)  Palpation:  normal      Mobility Tests:      Glenohumeral posterior right:  Hypomobile  Glenohumeral inferior right:  Hypomobile                                             General     ROS    Assessment/Plan:    Patient is a 80 year old male with both sides shoulder complaints.    Patient has the following significant findings with corresponding treatment plan.                Diagnosis 1:  R shoulder OA and L shoulder impingement  Pain -  hot/cold therapy and home program  Decreased ROM/flexibility - manual therapy and therapeutic exercise  Decreased joint mobility - manual therapy and therapeutic exercise  Decreased strength - therapeutic exercise and therapeutic activities  Decreased proprioception - neuro re-education and therapeutic activities  Inflammation - cold therapy and self management/home program  Impaired muscle performance - neuro re-education  Decreased function - therapeutic activities  Impaired posture - neuro re-education    Therapy Evaluation Codes:   1) History comprised of:   Personal factors that impact the plan of care:      None.    Comorbidity factors that impact the plan of care are:      Cancer, High blood pressure and Implanted device.      Medications impacting care: High blood pressure and Heparin/coumadin.  2) Examination of Body Systems comprised of:   Body structures and functions that impact the plan of care:      Shoulder.   Activity limitations that impact the plan of care are:      Bathing, Dressing, Lifting, Sports, Throwing and reaching.  3) Clinical presentation characteristics are:   Stable/Uncomplicated.  4) Decision-Making    Low complexity using standardized patient assessment instrument and/or measureable assessment of functional outcome.  Cumulative Therapy Evaluation is: Low complexity.    Previous and current functional limitations:  (See Goal Flow Sheet for this information)    Short term and Long term goals: (See Goal Flow Sheet for this information)     Communication ability:  Patient appears to be able to clearly communicate and understand verbal and written communication and follow directions correctly.  Treatment Explanation - The following has been discussed with the patient:   RX ordered/plan of care  Anticipated outcomes  Possible risks and side effects  This patient would benefit from PT intervention to resume normal activities.   Rehab potential is good.    Frequency:  1 X week, once daily  Duration:  for 8 weeks  Discharge Plan:  Achieve all LTG.  Independent in home treatment program.  Reach maximal therapeutic benefit.    Please refer to the daily flowsheet for treatment today, total treatment time and time spent performing 1:1 timed codes.

## 2020-09-22 ENCOUNTER — THERAPY VISIT (OUTPATIENT)
Dept: PHYSICAL THERAPY | Facility: CLINIC | Age: 80
End: 2020-09-22
Payer: COMMERCIAL

## 2020-09-22 DIAGNOSIS — M25.512 CHRONIC PAIN OF BOTH SHOULDERS: ICD-10-CM

## 2020-09-22 DIAGNOSIS — G89.29 CHRONIC PAIN OF BOTH SHOULDERS: ICD-10-CM

## 2020-09-22 DIAGNOSIS — M25.511 CHRONIC PAIN OF BOTH SHOULDERS: ICD-10-CM

## 2020-09-22 PROCEDURE — 97112 NEUROMUSCULAR REEDUCATION: CPT | Mod: GP | Performed by: PHYSICAL THERAPIST

## 2020-09-22 PROCEDURE — 97110 THERAPEUTIC EXERCISES: CPT | Mod: GP | Performed by: PHYSICAL THERAPIST

## 2020-09-22 NOTE — LETTER
[facility]  [address] [address] [place] [id number]  [phone]    2020    Re: Imer Burns   :   1940  MRN:  [id number]   REFERRING PHYSICIAN:   Ángel Ramon    [facility]  Date of Initial Evaluation:  2020  Visits:  Rxs Used: 2  Reason for Referral:  Chronic pain of both shoulders    Assessment/Plan:    DISCHARGE REPORT  Progress reporting period is from 20 to 20.     SUBJECTIVE  Subjective: No issues with HEP   Current Pain level: 0/10   Initial Pain level: (0-5/10)   Changes in function: Yes, see goal flow sheet for change in function   Adverse reactions: None;   ,     The subjective and objective information are from the last SOAP note on this patient.  Imer was unable to return to PT as his wife reported to us he suffered a massive cardiac event.    OBJECTIVE  Objective: R shoulder AROM: flex 107, abd 95, ER 55, IR/ext T11, ER/abd T1.  L shoulder AROM remains normal.  Neg impingement on L.  Mild pec minor tightness.      ASSESSMENT/PLAN  Updated problem list and treatment plan: Diagnosis 1:  Shoulder pain  Pain -  hot/cold therapy and home program  Decreased ROM/flexibility - therapeutic exercise  Decreased joint mobility - manual therapy and therapeutic exercise  Decreased strength - therapeutic exercise and therapeutic activities  Decreased proprioception - neuro re-education and therapeutic activities  Inflammation - cold therapy and self management/home program  Impaired muscle performance - neuro re-education  Decreased function - therapeutic activities  Impaired posture - neuro re-education  STG/LTGs have been met or progress has been made towards goals:  Yes (See Goal flow sheet completed today.)    Re: Imer Burns   :   1940    Assessment of Progress: The patient has not returned to therapy. Current status   is unknown.  Self Management Plans:  Patient has been instructed in a home treatment  program.  Patient  has been instructed in self management of symptoms.  I have re-evaluated this patient and find that the nature, scope, duration and intensity of the therapy is appropriate for the medical condition of the patient.  Imer continues to require the following intervention to meet STG and LTG's: PT  We will discharge this patient from PT.    Recommendations:  Discharge.      Thank you for your referral.    INQUIRIES  Therapist: Kristin Mendez, PT  [facility] [address]  Phone: [phone]  Fax: [phone]

## 2020-11-12 PROBLEM — M25.512 CHRONIC PAIN OF BOTH SHOULDERS: Status: RESOLVED | Noted: 2020-09-14 | Resolved: 2020-11-12

## 2020-11-12 PROBLEM — M25.511 CHRONIC PAIN OF BOTH SHOULDERS: Status: RESOLVED | Noted: 2020-09-14 | Resolved: 2020-11-12

## 2020-11-12 PROBLEM — G89.29 CHRONIC PAIN OF BOTH SHOULDERS: Status: RESOLVED | Noted: 2020-09-14 | Resolved: 2020-11-12

## 2020-11-12 NOTE — PROGRESS NOTES
Subjective:  HPI  Physical Exam                    Objective:  System    Physical Exam    General     ROS    Assessment/Plan:    DISCHARGE REPORT    Progress reporting period is from 9/14/20 to 9/22/20.     SUBJECTIVE  Subjective: No issues with HEP   Current Pain level: 0/10   Initial Pain level: (0-5/10)   Changes in function: Yes, see goal flow sheet for change in function   Adverse reactions: None;   ,     The subjective and objective information are from the last SOAP note on this patient.  Imer was unable to return to PT as his wife reported to us he suffered a massive cardiac event.    OBJECTIVE  Objective: R shoulder AROM: flex 107, abd 95, ER 55, IR/ext T11, ER/abd T1.  L shoulder AROM remains normal.  Neg impingement on L.  Mild pec minor tightness.      ASSESSMENT/PLAN  Updated problem list and treatment plan: Diagnosis 1:  Shoulder pain  Pain -  hot/cold therapy and home program  Decreased ROM/flexibility - therapeutic exercise  Decreased joint mobility - manual therapy and therapeutic exercise  Decreased strength - therapeutic exercise and therapeutic activities  Decreased proprioception - neuro re-education and therapeutic activities  Inflammation - cold therapy and self management/home program  Impaired muscle performance - neuro re-education  Decreased function - therapeutic activities  Impaired posture - neuro re-education  STG/LTGs have been met or progress has been made towards goals:  Yes (See Goal flow sheet completed today.)  Assessment of Progress: The patient has not returned to therapy. Current status is unknown.  Self Management Plans:  Patient has been instructed in a home treatment program.  Patient  has been instructed in self management of symptoms.  I have re-evaluated this patient and find that the nature, scope, duration and intensity of the therapy is appropriate for the medical condition of the patient.  Imer continues to require the following intervention to meet STG and LTG's:  PT  We will discharge this patient from PT.    Recommendations:  Discharge.    Please refer to the daily flowsheet for treatment today, total treatment time and time spent performing 1:1 timed codes.

## 2023-05-04 ENCOUNTER — HOSPITAL ENCOUNTER (OUTPATIENT)
Dept: CARDIOLOGY | Facility: HOSPITAL | Age: 83
Discharge: HOME | End: 2023-05-04
Attending: PHYSICAL MEDICINE & REHABILITATION
Payer: OTHER GOVERNMENT

## 2023-05-04 DIAGNOSIS — I25.10 CAD (CORONARY ARTERY DISEASE): ICD-10-CM

## 2023-05-04 LAB — LVEF ECHO: NORMAL

## (undated) DEVICE — SU VICRYL 0 CP-1 27" J467H

## (undated) DEVICE — PACK TOTAL KNEE SOP15TKFSD

## (undated) DEVICE — SOL NACL 0.9% IRRIG 1000ML BOTTLE 07138-09

## (undated) DEVICE — SU STRATAFIX PDS PLUS 0 CT 45CM SXPP1A406

## (undated) DEVICE — DRSG KERLIX FLUFFS X5

## (undated) DEVICE — BLADE SAW SAGITTAL STRK 19.5X90X1.20MM 2108-109-000S17

## (undated) DEVICE — WRAP EZY KNEE

## (undated) DEVICE — BONE CLEANING TIP INTERPULSE  0210-010-000

## (undated) DEVICE — SU MONOCRYL 3-0 PS-2 27" Y427H

## (undated) DEVICE — MANIFOLD NEPTUNE 4 PORT 700-20

## (undated) DEVICE — IMM KNEE 20" 0814-2660

## (undated) DEVICE — GLOVE PROTEXIS W/NEU-THERA 8.0  2D73TE80

## (undated) DEVICE — BLADE SAW RECIP STRK 70X12.5X1.2MM 0277-096-281

## (undated) DEVICE — LINEN TOWEL PACK X5 5464

## (undated) DEVICE — DRSG ADAPTIC 3X8" 6113

## (undated) DEVICE — SOL WATER IRRIG 1000ML BOTTLE 2F7114

## (undated) DEVICE — DRSG GAUZE 4X4" 3033

## (undated) DEVICE — SU DERMABOND PRINEO 22CM CLR222US

## (undated) DEVICE — GOWN IMPERVIOUS SPECIALTY XL/XLONG 39049

## (undated) DEVICE — SYR 50ML CATH TIP W/O NDL 309620

## (undated) DEVICE — DRAPE STERI TOWEL LG 1010

## (undated) DEVICE — SU ETHIBOND 0 CTX CR  8X18" CX31D

## (undated) DEVICE — ESU GROUND PAD UNIVERSAL W/O CORD

## (undated) DEVICE — DRAPE IOBAN INCISE 23X17" 6650EZ

## (undated) DEVICE — NDL SPINAL 18GA 3.5" 405184

## (undated) DEVICE — SYR 50ML LL W/O NDL 309653

## (undated) DEVICE — CAST PADDING 6" STERILE 9046S

## (undated) DEVICE — CAST PADDING 6" UNSTERILE 9046

## (undated) DEVICE — SUCTION IRR SYSTEM W/O TIP INTERPULSE HANDPIECE 0210-100-000

## (undated) DEVICE — SOLUTION WOUND CLEANSING 3/4OZ 10% PVP EA-L3011FB-50

## (undated) DEVICE — BONE CEMENT MIXEVAC III HI VAC KIT  0206-015-000

## (undated) DEVICE — BLADE CLIPPER 4412A

## (undated) DEVICE — ESU PENCIL W/SMOKE EVAC NEPTUNE STRYKER 0703-046-000

## (undated) DEVICE — GLOVE PROTEXIS MICRO 8.0  2D73PM80

## (undated) DEVICE — DRAIN ROUND W/RESERV KIT JACKSON PRATT 10FR 400ML SU130-402D

## (undated) DEVICE — SU VICRYL 2-0 CP-1 27" J466H

## (undated) DEVICE — PREP CHLORAPREP 26ML TINTED ORANGE  260815

## (undated) DEVICE — GLOVE PROTEXIS BLUE W/NEU-THERA 8.0  2D73EB80

## (undated) DEVICE — SOL WATER IRRIG 1000ML BOTTLE 07139-09

## (undated) RX ORDER — ROPIVACAINE HYDROCHLORIDE 2 MG/ML
INJECTION, SOLUTION EPIDURAL; INFILTRATION; PERINEURAL
Status: DISPENSED
Start: 2019-03-08

## (undated) RX ORDER — FENTANYL CITRATE 50 UG/ML
INJECTION, SOLUTION INTRAMUSCULAR; INTRAVENOUS
Status: DISPENSED
Start: 2019-03-08

## (undated) RX ORDER — CEFAZOLIN SODIUM 2 G/100ML
INJECTION, SOLUTION INTRAVENOUS
Status: DISPENSED
Start: 2019-03-08

## (undated) RX ORDER — PROPOFOL 10 MG/ML
INJECTION, EMULSION INTRAVENOUS
Status: DISPENSED
Start: 2019-03-08

## (undated) RX ORDER — HYDROMORPHONE HYDROCHLORIDE 1 MG/ML
INJECTION, SOLUTION INTRAMUSCULAR; INTRAVENOUS; SUBCUTANEOUS
Status: DISPENSED
Start: 2019-03-08

## (undated) RX ORDER — CEFAZOLIN SODIUM 1 G/3ML
INJECTION, POWDER, FOR SOLUTION INTRAMUSCULAR; INTRAVENOUS
Status: DISPENSED
Start: 2019-03-08

## (undated) RX ORDER — DEXAMETHASONE SODIUM PHOSPHATE 4 MG/ML
INJECTION, SOLUTION INTRA-ARTICULAR; INTRALESIONAL; INTRAMUSCULAR; INTRAVENOUS; SOFT TISSUE
Status: DISPENSED
Start: 2019-03-08

## (undated) RX ORDER — KETOROLAC TROMETHAMINE 30 MG/ML
INJECTION, SOLUTION INTRAMUSCULAR; INTRAVENOUS
Status: DISPENSED
Start: 2019-03-08

## (undated) RX ORDER — LIDOCAINE HYDROCHLORIDE 20 MG/ML
INJECTION, SOLUTION EPIDURAL; INFILTRATION; INTRACAUDAL; PERINEURAL
Status: DISPENSED
Start: 2019-03-08

## (undated) RX ORDER — ACYCLOVIR 200 MG/1
CAPSULE ORAL
Status: DISPENSED
Start: 2019-03-08

## (undated) RX ORDER — ONDANSETRON 2 MG/ML
INJECTION INTRAMUSCULAR; INTRAVENOUS
Status: DISPENSED
Start: 2019-03-08